# Patient Record
Sex: FEMALE | Race: BLACK OR AFRICAN AMERICAN | Employment: UNEMPLOYED | URBAN - METROPOLITAN AREA
[De-identification: names, ages, dates, MRNs, and addresses within clinical notes are randomized per-mention and may not be internally consistent; named-entity substitution may affect disease eponyms.]

---

## 2018-04-04 ENCOUNTER — HOSPITAL ENCOUNTER (OUTPATIENT)
Age: 18
Discharge: HOME | End: 2018-04-04
Payer: COMMERCIAL

## 2018-04-04 DIAGNOSIS — Z67.91 RH NEGATIVE STATE IN ANTEPARTUM PERIOD: Primary | ICD-10-CM

## 2018-04-04 DIAGNOSIS — O26.899 RH NEGATIVE STATE IN ANTEPARTUM PERIOD: Primary | ICD-10-CM

## 2018-04-04 LAB
ABO/RH: NORMAL
ANTIBODY SCREEN: NORMAL
BASOPHILS ABSOLUTE: 0.04 E9/L (ref 0–0.2)
BASOPHILS RELATIVE PERCENT: 0.4 % (ref 0–2)
EOSINOPHILS ABSOLUTE: 0.05 E9/L (ref 0.05–0.5)
EOSINOPHILS RELATIVE PERCENT: 0.6 % (ref 0–6)
GLUCOSE TOLERANCE SCREEN 50G: 153 MG/DL (ref 70–140)
HCT VFR BLD CALC: 33.9 % (ref 34–48)
HEMOGLOBIN: 11.5 G/DL (ref 11.5–15.5)
IMMATURE GRANULOCYTES #: 0.1 E9/L
IMMATURE GRANULOCYTES %: 1.1 % (ref 0–5)
LYMPHOCYTES ABSOLUTE: 1.56 E9/L (ref 1.5–4)
LYMPHOCYTES RELATIVE PERCENT: 17.5 % (ref 20–42)
Lab: NORMAL
MCH RBC QN AUTO: 32.5 PG (ref 26–35)
MCHC RBC AUTO-ENTMCNC: 33.9 % (ref 32–34.5)
MCV RBC AUTO: 95.8 FL (ref 80–99.9)
MONOCYTES ABSOLUTE: 0.61 E9/L (ref 0.1–0.95)
MONOCYTES RELATIVE PERCENT: 6.8 % (ref 2–12)
NEUTROPHILS ABSOLUTE: 6.55 E9/L (ref 1.8–7.3)
NEUTROPHILS RELATIVE PERCENT: 73.6 % (ref 43–80)
PDW BLD-RTO: 12.7 FL (ref 11.5–15)
PLATELET # BLD: 287 E9/L (ref 130–450)
PMV BLD AUTO: 9 FL (ref 7–12)
RBC # BLD: 3.54 E12/L (ref 3.5–5.5)
REPORT: NORMAL
THIS TEST SENT TO: NORMAL
WBC # BLD: 8.9 E9/L (ref 4.5–11.5)

## 2018-04-04 PROCEDURE — 82950 GLUCOSE TEST: CPT

## 2018-04-04 PROCEDURE — 36415 COLL VENOUS BLD VENIPUNCTURE: CPT

## 2018-04-04 PROCEDURE — 6360000002 HC RX W HCPCS: Performed by: SPECIALIST

## 2018-04-04 PROCEDURE — 86900 BLOOD TYPING SEROLOGIC ABO: CPT

## 2018-04-04 PROCEDURE — 86850 RBC ANTIBODY SCREEN: CPT

## 2018-04-04 PROCEDURE — 85025 COMPLETE CBC W/AUTO DIFF WBC: CPT

## 2018-04-04 PROCEDURE — 86901 BLOOD TYPING SEROLOGIC RH(D): CPT

## 2018-04-04 RX ADMIN — HUMAN RHO(D) IMMUNE GLOBULIN 300 MCG: 300 INJECTION, SOLUTION INTRAMUSCULAR at 11:55

## 2018-04-05 LAB — RHIG LOT NUMBER: NORMAL

## 2018-04-11 ENCOUNTER — HOSPITAL ENCOUNTER (OUTPATIENT)
Age: 18
Discharge: HOME | End: 2018-04-11
Payer: COMMERCIAL

## 2018-04-11 LAB
GLUCOSE FASTING: 81 MG/ML
GLUCOSE TOLERANCE TEST 1 HOUR: 162 MG/DL
GLUCOSE TOLERANCE TEST 2 HOUR: 119 MG/DL
GLUCOSE TOLERANCE TEST 3 HOUR: 76 MG/DL

## 2018-04-11 PROCEDURE — 82951 GLUCOSE TOLERANCE TEST (GTT): CPT

## 2018-04-11 PROCEDURE — 82952 GTT-ADDED SAMPLES: CPT

## 2018-04-11 PROCEDURE — 36415 COLL VENOUS BLD VENIPUNCTURE: CPT

## 2018-06-23 ENCOUNTER — ANESTHESIA (OUTPATIENT)
Dept: LABOR AND DELIVERY | Age: 18
End: 2018-06-23
Payer: COMMERCIAL

## 2018-06-23 ENCOUNTER — ANESTHESIA EVENT (OUTPATIENT)
Dept: LABOR AND DELIVERY | Age: 18
End: 2018-06-23
Payer: COMMERCIAL

## 2018-06-23 ENCOUNTER — HOSPITAL ENCOUNTER (INPATIENT)
Age: 18
LOS: 2 days | Discharge: HOME | End: 2018-06-25
Attending: SPECIALIST | Admitting: SPECIALIST
Payer: COMMERCIAL

## 2018-06-23 PROBLEM — Z34.90 TERM PREGNANCY: Status: ACTIVE | Noted: 2018-06-23

## 2018-06-23 LAB
ABO/RH: NORMAL
AMNISURE PATIENT RESULT: POSITIVE
AMPHETAMINE SCREEN, URINE: NOT DETECTED
ANTIBODY SCREEN: NORMAL
BARBITURATE SCREEN URINE: NOT DETECTED
BASOPHILS ABSOLUTE: 0.05 E9/L (ref 0–0.2)
BASOPHILS RELATIVE PERCENT: 0.4 % (ref 0–2)
BENZODIAZEPINE SCREEN, URINE: NOT DETECTED
CANNABINOID SCREEN URINE: NOT DETECTED
COCAINE METABOLITE SCREEN URINE: NOT DETECTED
EOSINOPHILS ABSOLUTE: 0.21 E9/L (ref 0.05–0.5)
EOSINOPHILS RELATIVE PERCENT: 1.7 % (ref 0–6)
HCT VFR BLD CALC: 31 % (ref 34–48)
HEMOGLOBIN: 10.5 G/DL (ref 11.5–15.5)
IMMATURE GRANULOCYTES #: 0.16 E9/L
IMMATURE GRANULOCYTES %: 1.3 % (ref 0–5)
LYMPHOCYTES ABSOLUTE: 2.96 E9/L (ref 1.5–4)
LYMPHOCYTES RELATIVE PERCENT: 23.8 % (ref 20–42)
MCH RBC QN AUTO: 31.6 PG (ref 26–35)
MCHC RBC AUTO-ENTMCNC: 33.9 % (ref 32–34.5)
MCV RBC AUTO: 93.4 FL (ref 80–99.9)
METHADONE SCREEN, URINE: NOT DETECTED
MONOCYTES ABSOLUTE: 1.24 E9/L (ref 0.1–0.95)
MONOCYTES RELATIVE PERCENT: 10 % (ref 2–12)
NEUTROPHILS ABSOLUTE: 7.8 E9/L (ref 1.8–7.3)
NEUTROPHILS RELATIVE PERCENT: 62.8 % (ref 43–80)
OPIATE SCREEN URINE: NOT DETECTED
PDW BLD-RTO: 12.9 FL (ref 11.5–15)
PHENCYCLIDINE SCREEN URINE: NOT DETECTED
PLATELET # BLD: 348 E9/L (ref 130–450)
PMV BLD AUTO: 9.4 FL (ref 7–12)
PROPOXYPHENE SCREEN: NOT DETECTED
RBC # BLD: 3.32 E12/L (ref 3.5–5.5)
WBC # BLD: 12.4 E9/L (ref 4.5–11.5)

## 2018-06-23 PROCEDURE — 86850 RBC ANTIBODY SCREEN: CPT

## 2018-06-23 PROCEDURE — 6360000002 HC RX W HCPCS: Performed by: SPECIALIST

## 2018-06-23 PROCEDURE — 86900 BLOOD TYPING SEROLOGIC ABO: CPT

## 2018-06-23 PROCEDURE — 3700000025 ANESTHESIA EPIDURAL BLOCK: Performed by: ANESTHESIOLOGY

## 2018-06-23 PROCEDURE — 51702 INSERT TEMP BLADDER CATH: CPT

## 2018-06-23 PROCEDURE — 7200000001 HC VAGINAL DELIVERY

## 2018-06-23 PROCEDURE — 36415 COLL VENOUS BLD VENIPUNCTURE: CPT

## 2018-06-23 PROCEDURE — 00HU33Z INSERTION OF INFUSION DEVICE INTO SPINAL CANAL, PERCUTANEOUS APPROACH: ICD-10-PCS | Performed by: SPECIALIST

## 2018-06-23 PROCEDURE — 1220000001 HC SEMI PRIVATE L&D R&B

## 2018-06-23 PROCEDURE — 3E0R3BZ INTRODUCTION OF ANESTHETIC AGENT INTO SPINAL CANAL, PERCUTANEOUS APPROACH: ICD-10-PCS | Performed by: SPECIALIST

## 2018-06-23 PROCEDURE — 86901 BLOOD TYPING SEROLOGIC RH(D): CPT

## 2018-06-23 PROCEDURE — 80307 DRUG TEST PRSMV CHEM ANLYZR: CPT

## 2018-06-23 PROCEDURE — 6370000000 HC RX 637 (ALT 250 FOR IP): Performed by: SPECIALIST

## 2018-06-23 PROCEDURE — 85025 COMPLETE CBC W/AUTO DIFF WBC: CPT

## 2018-06-23 RX ORDER — 0.9 % SODIUM CHLORIDE 0.9 %
500 INTRAVENOUS SOLUTION INTRAVENOUS ONCE
Status: DISCONTINUED | OUTPATIENT
Start: 2018-06-23 | End: 2018-06-25 | Stop reason: HOSPADM

## 2018-06-23 RX ORDER — SODIUM CHLORIDE 0.9 % (FLUSH) 0.9 %
10 SYRINGE (ML) INJECTION PRN
Status: DISCONTINUED | OUTPATIENT
Start: 2018-06-23 | End: 2018-06-25 | Stop reason: HOSPADM

## 2018-06-23 RX ORDER — SODIUM CHLORIDE 0.9 % (FLUSH) 0.9 %
10 SYRINGE (ML) INJECTION EVERY 12 HOURS SCHEDULED
Status: DISCONTINUED | OUTPATIENT
Start: 2018-06-23 | End: 2018-06-23

## 2018-06-23 RX ORDER — IBUPROFEN 600 MG/1
600 TABLET ORAL EVERY 6 HOURS PRN
Status: DISCONTINUED | OUTPATIENT
Start: 2018-06-23 | End: 2018-06-25 | Stop reason: HOSPADM

## 2018-06-23 RX ORDER — LIDOCAINE HYDROCHLORIDE 10 MG/ML
30 INJECTION, SOLUTION EPIDURAL; INFILTRATION; INTRACAUDAL; PERINEURAL PRN
Status: DISCONTINUED | OUTPATIENT
Start: 2018-06-23 | End: 2018-06-23

## 2018-06-23 RX ORDER — SODIUM CHLORIDE, SODIUM LACTATE, POTASSIUM CHLORIDE, CALCIUM CHLORIDE 600; 310; 30; 20 MG/100ML; MG/100ML; MG/100ML; MG/100ML
INJECTION, SOLUTION INTRAVENOUS CONTINUOUS
Status: DISCONTINUED | OUTPATIENT
Start: 2018-06-23 | End: 2018-06-23 | Stop reason: SDUPTHER

## 2018-06-23 RX ORDER — ONDANSETRON 2 MG/ML
4 INJECTION INTRAMUSCULAR; INTRAVENOUS EVERY 6 HOURS PRN
Status: DISCONTINUED | OUTPATIENT
Start: 2018-06-23 | End: 2018-06-23

## 2018-06-23 RX ORDER — DOCUSATE SODIUM 100 MG/1
100 CAPSULE, LIQUID FILLED ORAL 2 TIMES DAILY
Status: DISCONTINUED | OUTPATIENT
Start: 2018-06-23 | End: 2018-06-25 | Stop reason: HOSPADM

## 2018-06-23 RX ORDER — EPHEDRINE SULFATE/0.9% NACL/PF 50 MG/5 ML
5 SYRINGE (ML) INTRAVENOUS PRN
Status: DISCONTINUED | OUTPATIENT
Start: 2018-06-23 | End: 2018-06-23

## 2018-06-23 RX ORDER — SODIUM CHLORIDE 0.9 % (FLUSH) 0.9 %
10 SYRINGE (ML) INJECTION PRN
Status: DISCONTINUED | OUTPATIENT
Start: 2018-06-23 | End: 2018-06-23

## 2018-06-23 RX ORDER — SODIUM CHLORIDE 0.9 % (FLUSH) 0.9 %
10 SYRINGE (ML) INJECTION EVERY 12 HOURS SCHEDULED
Status: DISCONTINUED | OUTPATIENT
Start: 2018-06-23 | End: 2018-06-25 | Stop reason: HOSPADM

## 2018-06-23 RX ORDER — ACETAMINOPHEN 325 MG/1
650 TABLET ORAL EVERY 4 HOURS PRN
Status: DISCONTINUED | OUTPATIENT
Start: 2018-06-23 | End: 2018-06-25 | Stop reason: HOSPADM

## 2018-06-23 RX ORDER — METHYLERGONOVINE MALEATE 0.2 MG/ML
200 INJECTION INTRAVENOUS
Status: ACTIVE | OUTPATIENT
Start: 2018-06-23 | End: 2018-06-23

## 2018-06-23 RX ORDER — SODIUM CHLORIDE, SODIUM LACTATE, POTASSIUM CHLORIDE, CALCIUM CHLORIDE 600; 310; 30; 20 MG/100ML; MG/100ML; MG/100ML; MG/100ML
INJECTION, SOLUTION INTRAVENOUS CONTINUOUS
Status: DISCONTINUED | OUTPATIENT
Start: 2018-06-23 | End: 2018-06-23

## 2018-06-23 RX ORDER — DOCUSATE SODIUM 100 MG/1
100 CAPSULE, LIQUID FILLED ORAL 2 TIMES DAILY
Status: DISCONTINUED | OUTPATIENT
Start: 2018-06-23 | End: 2018-06-23

## 2018-06-23 RX ORDER — ONDANSETRON 4 MG/1
4 TABLET, FILM COATED ORAL EVERY 8 HOURS PRN
Status: ON HOLD | COMMUNITY
End: 2018-06-24 | Stop reason: HOSPADM

## 2018-06-23 RX ORDER — TERBUTALINE SULFATE 1 MG/ML
0.25 INJECTION, SOLUTION SUBCUTANEOUS ONCE
Status: DISCONTINUED | OUTPATIENT
Start: 2018-06-23 | End: 2018-06-23

## 2018-06-23 RX ORDER — NALBUPHINE HCL 10 MG/ML
10 AMPUL (ML) INJECTION
Status: DISCONTINUED | OUTPATIENT
Start: 2018-06-23 | End: 2018-06-23

## 2018-06-23 RX ADMIN — BENZOCAINE AND LEVOMENTHOL: 200; 5 SPRAY TOPICAL at 12:38

## 2018-06-23 RX ADMIN — ONDANSETRON 4 MG: 2 INJECTION INTRAMUSCULAR; INTRAVENOUS at 07:35

## 2018-06-23 RX ADMIN — NALBUPHINE HYDROCHLORIDE 10 MG: 10 INJECTION, SOLUTION INTRAMUSCULAR; INTRAVENOUS; SUBCUTANEOUS at 07:30

## 2018-06-23 RX ADMIN — IBUPROFEN 600 MG: 600 TABLET ORAL at 12:38

## 2018-06-23 RX ADMIN — Medication 2 MILLI-UNITS/MIN: at 06:00

## 2018-06-23 RX ADMIN — DOCUSATE SODIUM 100 MG: 100 CAPSULE, LIQUID FILLED ORAL at 21:07

## 2018-06-23 ASSESSMENT — PAIN - FUNCTIONAL ASSESSMENT: PAIN_FUNCTIONAL_ASSESSMENT: 0-10

## 2018-06-23 ASSESSMENT — PAIN SCALES - GENERAL
PAINLEVEL_OUTOF10: 0
PAINLEVEL_OUTOF10: 2
PAINLEVEL_OUTOF10: 9

## 2018-06-24 PROBLEM — Z34.90 TERM PREGNANCY: Status: RESOLVED | Noted: 2018-06-23 | Resolved: 2018-06-24

## 2018-06-24 LAB
FETAL SCREEN: NORMAL
HCT VFR BLD CALC: 33.6 % (ref 34–48)
HEMOGLOBIN: 11.3 G/DL (ref 11.5–15.5)
MCH RBC QN AUTO: 32.2 PG (ref 26–35)
MCHC RBC AUTO-ENTMCNC: 33.6 % (ref 32–34.5)
MCV RBC AUTO: 95.7 FL (ref 80–99.9)
PDW BLD-RTO: 13.3 FL (ref 11.5–15)
PLATELET # BLD: 345 E9/L (ref 130–450)
PMV BLD AUTO: 9.3 FL (ref 7–12)
RBC # BLD: 3.51 E12/L (ref 3.5–5.5)
RHIG LOT NUMBER: NORMAL
WBC # BLD: 13.1 E9/L (ref 4.5–11.5)

## 2018-06-24 PROCEDURE — 1220000001 HC SEMI PRIVATE L&D R&B

## 2018-06-24 PROCEDURE — 85461 HEMOGLOBIN FETAL: CPT

## 2018-06-24 PROCEDURE — 85027 COMPLETE CBC AUTOMATED: CPT

## 2018-06-24 PROCEDURE — 6360000002 HC RX W HCPCS: Performed by: SPECIALIST

## 2018-06-24 PROCEDURE — 96372 THER/PROPH/DIAG INJ SC/IM: CPT

## 2018-06-24 PROCEDURE — 90471 IMMUNIZATION ADMIN: CPT | Performed by: SPECIALIST

## 2018-06-24 PROCEDURE — 6370000000 HC RX 637 (ALT 250 FOR IP): Performed by: SPECIALIST

## 2018-06-24 PROCEDURE — 90715 TDAP VACCINE 7 YRS/> IM: CPT | Performed by: SPECIALIST

## 2018-06-24 PROCEDURE — 36415 COLL VENOUS BLD VENIPUNCTURE: CPT

## 2018-06-24 RX ADMIN — ACETAMINOPHEN 650 MG: 325 TABLET, FILM COATED ORAL at 15:36

## 2018-06-24 RX ADMIN — IBUPROFEN 600 MG: 600 TABLET ORAL at 13:09

## 2018-06-24 RX ADMIN — DOCUSATE SODIUM 100 MG: 100 CAPSULE, LIQUID FILLED ORAL at 10:08

## 2018-06-24 RX ADMIN — HUMAN RHO(D) IMMUNE GLOBULIN 300 MCG: 300 INJECTION, SOLUTION INTRAMUSCULAR at 10:06

## 2018-06-24 RX ADMIN — IBUPROFEN 600 MG: 600 TABLET ORAL at 06:12

## 2018-06-24 RX ADMIN — TETANUS TOXOID, REDUCED DIPHTHERIA TOXOID AND ACELLULAR PERTUSSIS VACCINE, ADSORBED 0.5 ML: 5; 2.5; 8; 8; 2.5 SUSPENSION INTRAMUSCULAR at 10:07

## 2018-06-24 RX ADMIN — DOCUSATE SODIUM 100 MG: 100 CAPSULE, LIQUID FILLED ORAL at 22:48

## 2018-06-24 ASSESSMENT — PAIN SCALES - GENERAL
PAINLEVEL_OUTOF10: 4
PAINLEVEL_OUTOF10: 5
PAINLEVEL_OUTOF10: 7

## 2018-06-25 VITALS
TEMPERATURE: 98.9 F | OXYGEN SATURATION: 99 % | RESPIRATION RATE: 16 BRPM | HEART RATE: 60 BPM | SYSTOLIC BLOOD PRESSURE: 112 MMHG | HEIGHT: 63 IN | WEIGHT: 145 LBS | DIASTOLIC BLOOD PRESSURE: 71 MMHG | BODY MASS INDEX: 25.69 KG/M2

## 2018-06-25 PROCEDURE — 6370000000 HC RX 637 (ALT 250 FOR IP): Performed by: SPECIALIST

## 2018-06-25 RX ADMIN — DOCUSATE SODIUM 100 MG: 100 CAPSULE, LIQUID FILLED ORAL at 10:06

## 2018-06-25 ASSESSMENT — PAIN SCALES - GENERAL: PAINLEVEL_OUTOF10: 0

## 2022-10-15 ENCOUNTER — HOSPITAL ENCOUNTER (EMERGENCY)
Age: 22
Discharge: HOME | End: 2022-10-15
Attending: FAMILY MEDICINE

## 2022-10-15 VITALS
HEIGHT: 64 IN | HEART RATE: 67 BPM | WEIGHT: 143 LBS | RESPIRATION RATE: 16 BRPM | DIASTOLIC BLOOD PRESSURE: 75 MMHG | OXYGEN SATURATION: 99 % | BODY MASS INDEX: 24.41 KG/M2 | TEMPERATURE: 97.1 F | SYSTOLIC BLOOD PRESSURE: 116 MMHG

## 2022-10-15 DIAGNOSIS — R55 VASOVAGAL SYNCOPE: Primary | ICD-10-CM

## 2022-10-15 PROCEDURE — 99283 EMERGENCY DEPT VISIT LOW MDM: CPT

## 2022-10-15 PROCEDURE — 93005 ELECTROCARDIOGRAM TRACING: CPT | Performed by: FAMILY MEDICINE

## 2022-10-15 ASSESSMENT — PAIN - FUNCTIONAL ASSESSMENT: PAIN_FUNCTIONAL_ASSESSMENT: NONE - DENIES PAIN

## 2022-10-15 NOTE — ED PROVIDER NOTES
HPI:  10/15/22,   Time: 2:48 PM EDT         Charlee Johnston is a 19 y.o. female presenting to the ED for syncopal episode that occurred today, after she got up and was moving around. She became lightheaded, passed out, did not hit her head. She did not lose control of bowel or bladder. She did not bite her tongue. She has had episodes of lightheadedness in the past, had 2 syncopal episodes during her pregnancy. ROS:   Pertinent positives and negatives are stated within HPI, all other systems reviewed and are negative.  --------------------------------------------- PAST HISTORY ---------------------------------------------  Past Medical History:  has a past medical history of Asthma, Heart problem, Mood disorder ([address] and Scoliosis. Past Surgical History:  has a past surgical history that includes Dental surgery and Dental surgery. Social History:  reports that she has never smoked. She has never used smokeless tobacco. She reports that she does not drink alcohol and does not use drugs. Family History: family history is not on file. The patients home medications have been reviewed. Allergies: Patient has no known allergies.     -------------------------------------------------- RESULTS -------------------------------------------------  All laboratory and radiology results have been personally reviewed by myself   LABS:  Results for orders placed or performed during the hospital encounter of 10/15/22   EKG 12 Lead   Result Value Ref Range    Ventricular Rate 57 BPM    Atrial Rate 57 BPM    P-R Interval 144 ms    QRS Duration 82 ms    Q-T Interval 422 ms    QTc Calculation (Bazett) 410 ms    P Axis 60 degrees    R Axis 62 degrees    T Axis 46 degrees       RADIOLOGY:  Interpreted by Radiologist.  No orders to display       ------------------------- NURSING NOTES AND VITALS REVIEWED ---------------------------   The nursing notes within the ED encounter and vital signs as below have been reviewed. /75   Pulse 67   Temp 97.1 °F (36.2 °C) (Temporal)   Resp 16   Ht 5' 4\" (1.626 m)   Wt 143 lb (64.9 kg)   LMP 09/26/2022   SpO2 99%   BMI 24.55 kg/m²   Oxygen Saturation Interpretation: Normal      ---------------------------------------------------PHYSICAL EXAM--------------------------------------    Constitutional/General: Alert and oriented x3, well appearing, non toxic in NAD  Head: NC/AT  Eyes: PERRL, EOMI  Mouth: Oropharynx clear, handling secretions, no trismus  Neck: Supple, full ROM, no meningeal signs  Pulmonary: Lungs clear to auscultation bilaterally, no wheezes, rales, or rhonchi. Not in respiratory distress  Cardiovascular:  Regular rate and rhythm, no murmurs, gallops, or rubs. 2+ distal pulses  Abdomen: Soft, non tender, non distended,   Extremities: Moves all extremities x 4. Warm and well perfused  Skin: warm and dry without rash  Neurologic: GCS 15,  Psych: Normal Affect      ------------------------------ ED COURSE/MEDICAL DECISION MAKING----------------------  Medications - No data to display    EKG: This EKG is signed and interpreted by me. Rate: 57  Rhythm: Sinus  Interpretation: sinus bradycardia with sinus arrhythmia  Comparison: no previous EKG available       Medical Decision Making:    Patient is asymptomatic, vital signs are stable, heart rate is mildly bradycardic. We given information regarding obtaining a primary care provider. Should her symptoms recur, she go to emergency department that has cardiology support. She was agreeable to this plan she was discharged home in stable condition. Counseling: The emergency provider has spoken with the patient and discussed todays results, in addition to providing specific details for the plan of care and counseling regarding the diagnosis and prognosis.   Questions are answered at this time and they are agreeable with the plan.      --------------------------------- IMPRESSION AND DISPOSITION ---------------------------------    IMPRESSION  1.  Vasovagal syncope        DISPOSITION  Disposition: Discharge to home  Patient condition is stable                 Ariel Olsen MD  10/15/22 1539

## 2022-10-16 LAB
EKG ATRIAL RATE: 57 BPM
EKG P AXIS: 60 DEGREES
EKG P-R INTERVAL: 144 MS
EKG Q-T INTERVAL: 422 MS
EKG QRS DURATION: 82 MS
EKG QTC CALCULATION (BAZETT): 410 MS
EKG R AXIS: 62 DEGREES
EKG T AXIS: 46 DEGREES
EKG VENTRICULAR RATE: 57 BPM

## 2022-12-14 ENCOUNTER — OFFICE VISIT (OUTPATIENT)
Dept: PRIMARY CARE CLINIC | Age: 22
End: 2022-12-14
Payer: MEDICAID

## 2022-12-14 VITALS
OXYGEN SATURATION: 99 % | HEART RATE: 67 BPM | TEMPERATURE: 97.7 F | BODY MASS INDEX: 21.24 KG/M2 | HEIGHT: 64 IN | SYSTOLIC BLOOD PRESSURE: 114 MMHG | DIASTOLIC BLOOD PRESSURE: 72 MMHG | RESPIRATION RATE: 18 BRPM | WEIGHT: 124.4 LBS

## 2022-12-14 DIAGNOSIS — M25.50 POLYARTHRALGIA: ICD-10-CM

## 2022-12-14 DIAGNOSIS — R55 SYNCOPE, UNSPECIFIED SYNCOPE TYPE: Primary | ICD-10-CM

## 2022-12-14 DIAGNOSIS — F41.9 ANXIETY: ICD-10-CM

## 2022-12-14 DIAGNOSIS — Z83.3 FAMILY HISTORY OF DIABETES MELLITUS: ICD-10-CM

## 2022-12-14 LAB
ALBUMIN SERPL-MCNC: 4.7 G/DL (ref 3.5–5.2)
ALP BLD-CCNC: 49 U/L (ref 35–104)
ALT SERPL-CCNC: 7 U/L (ref 0–32)
ANION GAP SERPL CALCULATED.3IONS-SCNC: 13 MMOL/L (ref 7–16)
AST SERPL-CCNC: 14 U/L (ref 0–31)
BASOPHILS ABSOLUTE: 0.04 E9/L (ref 0–0.2)
BASOPHILS RELATIVE PERCENT: 0.5 % (ref 0–2)
BILIRUB SERPL-MCNC: 0.4 MG/DL (ref 0–1.2)
BUN BLDV-MCNC: 6 MG/DL (ref 6–20)
C-REACTIVE PROTEIN: 0.7 MG/DL (ref 0–0.4)
CALCIUM SERPL-MCNC: 10.2 MG/DL (ref 8.6–10.2)
CHLORIDE BLD-SCNC: 102 MMOL/L (ref 98–107)
CO2: 22 MMOL/L (ref 22–29)
CREAT SERPL-MCNC: 0.7 MG/DL (ref 0.5–1)
EOSINOPHILS ABSOLUTE: 0.05 E9/L (ref 0.05–0.5)
EOSINOPHILS RELATIVE PERCENT: 0.6 % (ref 0–6)
GFR SERPL CREATININE-BSD FRML MDRD: >60 ML/MIN/1.73
GLUCOSE BLD-MCNC: 94 MG/DL (ref 74–99)
HBA1C MFR BLD: 5.1 % (ref 4–5.6)
HCT VFR BLD CALC: 35.7 % (ref 34–48)
HEMOGLOBIN: 11.7 G/DL (ref 11.5–15.5)
IMMATURE GRANULOCYTES #: 0.02 E9/L
IMMATURE GRANULOCYTES %: 0.2 % (ref 0–5)
LYMPHOCYTES ABSOLUTE: 1.92 E9/L (ref 1.5–4)
LYMPHOCYTES RELATIVE PERCENT: 23.4 % (ref 20–42)
MCH RBC QN AUTO: 28.8 PG (ref 26–35)
MCHC RBC AUTO-ENTMCNC: 32.8 % (ref 32–34.5)
MCV RBC AUTO: 87.9 FL (ref 80–99.9)
MONOCYTES ABSOLUTE: 0.68 E9/L (ref 0.1–0.95)
MONOCYTES RELATIVE PERCENT: 8.3 % (ref 2–12)
NEUTROPHILS ABSOLUTE: 5.5 E9/L (ref 1.8–7.3)
NEUTROPHILS RELATIVE PERCENT: 67 % (ref 43–80)
PDW BLD-RTO: 15.2 FL (ref 11.5–15)
PLATELET # BLD: 307 E9/L (ref 130–450)
PMV BLD AUTO: 10.7 FL (ref 7–12)
POTASSIUM SERPL-SCNC: 3.8 MMOL/L (ref 3.5–5)
RBC # BLD: 4.06 E12/L (ref 3.5–5.5)
SEDIMENTATION RATE, ERYTHROCYTE: 18 MM/HR (ref 0–20)
SODIUM BLD-SCNC: 137 MMOL/L (ref 132–146)
TOTAL PROTEIN: 8 G/DL (ref 6.4–8.3)
TSH SERPL DL<=0.05 MIU/L-ACNC: 0.3 UIU/ML (ref 0.27–4.2)
WBC # BLD: 8.2 E9/L (ref 4.5–11.5)

## 2022-12-14 PROCEDURE — 99204 OFFICE O/P NEW MOD 45 MIN: CPT | Performed by: STUDENT IN AN ORGANIZED HEALTH CARE EDUCATION/TRAINING PROGRAM

## 2022-12-14 RX ORDER — HYDROXYZINE PAMOATE 25 MG/1
25 CAPSULE ORAL NIGHTLY
Qty: 30 CAPSULE | Refills: 0 | Status: SHIPPED | OUTPATIENT
Start: 2022-12-14 | End: 2023-01-13

## 2022-12-14 RX ORDER — ESCITALOPRAM OXALATE 5 MG/1
5 TABLET ORAL DAILY
Qty: 30 TABLET | Refills: 0 | Status: SHIPPED | OUTPATIENT
Start: 2022-12-14

## 2022-12-14 RX ORDER — MELOXICAM 15 MG/1
15 TABLET ORAL DAILY
Qty: 90 TABLET | Refills: 1 | Status: SHIPPED | OUTPATIENT
Start: 2022-12-14

## 2022-12-14 SDOH — ECONOMIC STABILITY: FOOD INSECURITY: WITHIN THE PAST 12 MONTHS, YOU WORRIED THAT YOUR FOOD WOULD RUN OUT BEFORE YOU GOT MONEY TO BUY MORE.: NEVER TRUE

## 2022-12-14 SDOH — ECONOMIC STABILITY: FOOD INSECURITY: WITHIN THE PAST 12 MONTHS, THE FOOD YOU BOUGHT JUST DIDN'T LAST AND YOU DIDN'T HAVE MONEY TO GET MORE.: NEVER TRUE

## 2022-12-14 ASSESSMENT — PATIENT HEALTH QUESTIONNAIRE - PHQ9
9. THOUGHTS THAT YOU WOULD BE BETTER OFF DEAD, OR OF HURTING YOURSELF: 0
4. FEELING TIRED OR HAVING LITTLE ENERGY: 1
6. FEELING BAD ABOUT YOURSELF - OR THAT YOU ARE A FAILURE OR HAVE LET YOURSELF OR YOUR FAMILY DOWN: 3
1. LITTLE INTEREST OR PLEASURE IN DOING THINGS: 3
7. TROUBLE CONCENTRATING ON THINGS, SUCH AS READING THE NEWSPAPER OR WATCHING TELEVISION: 0
SUM OF ALL RESPONSES TO PHQ QUESTIONS 1-9: 14
10. IF YOU CHECKED OFF ANY PROBLEMS, HOW DIFFICULT HAVE THESE PROBLEMS MADE IT FOR YOU TO DO YOUR WORK, TAKE CARE OF THINGS AT HOME, OR GET ALONG WITH OTHER PEOPLE: 1
SUM OF ALL RESPONSES TO PHQ QUESTIONS 1-9: 14
2. FEELING DOWN, DEPRESSED OR HOPELESS: 3
SUM OF ALL RESPONSES TO PHQ QUESTIONS 1-9: 14
5. POOR APPETITE OR OVEREATING: 3
3. TROUBLE FALLING OR STAYING ASLEEP: 1
SUM OF ALL RESPONSES TO PHQ QUESTIONS 1-9: 14
8. MOVING OR SPEAKING SO SLOWLY THAT OTHER PEOPLE COULD HAVE NOTICED. OR THE OPPOSITE, BEING SO FIGETY OR RESTLESS THAT YOU HAVE BEEN MOVING AROUND A LOT MORE THAN USUAL: 0
SUM OF ALL RESPONSES TO PHQ9 QUESTIONS 1 & 2: 6

## 2022-12-14 ASSESSMENT — SOCIAL DETERMINANTS OF HEALTH (SDOH): HOW HARD IS IT FOR YOU TO PAY FOR THE VERY BASICS LIKE FOOD, HOUSING, MEDICAL CARE, AND HEATING?: SOMEWHAT HARD

## 2022-12-14 ASSESSMENT — ENCOUNTER SYMPTOMS
RESPIRATORY NEGATIVE: 1
GASTROINTESTINAL NEGATIVE: 1

## 2022-12-20 DIAGNOSIS — M41.9 SCOLIOSIS, UNSPECIFIED SCOLIOSIS TYPE, UNSPECIFIED SPINAL REGION: Primary | ICD-10-CM

## 2023-01-28 DIAGNOSIS — M25.50 POLYARTHRALGIA: ICD-10-CM

## 2023-01-28 DIAGNOSIS — F41.9 ANXIETY: ICD-10-CM

## 2023-01-30 ENCOUNTER — PATIENT MESSAGE (OUTPATIENT)
Dept: PRIMARY CARE CLINIC | Age: 23
End: 2023-01-30

## 2023-01-30 RX ORDER — MELOXICAM 15 MG/1
15 TABLET ORAL DAILY
Qty: 90 TABLET | Refills: 1 | Status: SHIPPED | OUTPATIENT
Start: 2023-01-30

## 2023-01-30 RX ORDER — ESCITALOPRAM OXALATE 5 MG/1
5 TABLET ORAL DAILY
Qty: 30 TABLET | Refills: 0 | Status: SHIPPED | OUTPATIENT
Start: 2023-01-30

## 2023-01-30 NOTE — TELEPHONE ENCOUNTER
From: Elmo Mitchell  To: Dr. Jesus Diaz  Sent: 1/30/2023 11:11 AM EST  Subject: Quick question     Do you guys do Tb tests at all?

## 2023-02-01 ENCOUNTER — HOSPITAL ENCOUNTER (OUTPATIENT)
Dept: GENERAL RADIOLOGY | Age: 23
Discharge: HOME | End: 2023-02-03
Payer: MEDICAID

## 2023-02-01 ENCOUNTER — HOSPITAL ENCOUNTER (OUTPATIENT)
Age: 23
Discharge: HOME | End: 2023-02-03
Payer: MEDICAID

## 2023-02-01 DIAGNOSIS — M41.9 SCOLIOSIS, UNSPECIFIED SCOLIOSIS TYPE, UNSPECIFIED SPINAL REGION: ICD-10-CM

## 2023-02-01 PROCEDURE — 72082 X-RAY EXAM ENTIRE SPI 2/3 VW: CPT

## 2023-02-09 ENCOUNTER — OFFICE VISIT (OUTPATIENT)
Dept: PRIMARY CARE CLINIC | Age: 23
End: 2023-02-09
Payer: MEDICAID

## 2023-02-09 VITALS
SYSTOLIC BLOOD PRESSURE: 100 MMHG | HEIGHT: 64 IN | HEART RATE: 79 BPM | WEIGHT: 133 LBS | OXYGEN SATURATION: 99 % | DIASTOLIC BLOOD PRESSURE: 64 MMHG | TEMPERATURE: 97.8 F | BODY MASS INDEX: 22.71 KG/M2

## 2023-02-09 DIAGNOSIS — F41.9 ANXIETY: Primary | ICD-10-CM

## 2023-02-09 PROCEDURE — 99213 OFFICE O/P EST LOW 20 MIN: CPT | Performed by: STUDENT IN AN ORGANIZED HEALTH CARE EDUCATION/TRAINING PROGRAM

## 2023-02-09 RX ORDER — ESCITALOPRAM OXALATE 10 MG/1
10 TABLET ORAL DAILY
Qty: 90 TABLET | Refills: 1 | Status: SHIPPED | OUTPATIENT
Start: 2023-02-09

## 2023-02-09 SDOH — ECONOMIC STABILITY: INCOME INSECURITY: HOW HARD IS IT FOR YOU TO PAY FOR THE VERY BASICS LIKE FOOD, HOUSING, MEDICAL CARE, AND HEATING?: NOT HARD AT ALL

## 2023-02-09 SDOH — ECONOMIC STABILITY: FOOD INSECURITY: WITHIN THE PAST 12 MONTHS, YOU WORRIED THAT YOUR FOOD WOULD RUN OUT BEFORE YOU GOT MONEY TO BUY MORE.: NEVER TRUE

## 2023-02-09 SDOH — ECONOMIC STABILITY: FOOD INSECURITY: WITHIN THE PAST 12 MONTHS, THE FOOD YOU BOUGHT JUST DIDN'T LAST AND YOU DIDN'T HAVE MONEY TO GET MORE.: NEVER TRUE

## 2023-02-09 SDOH — ECONOMIC STABILITY: HOUSING INSECURITY
IN THE LAST 12 MONTHS, WAS THERE A TIME WHEN YOU DID NOT HAVE A STEADY PLACE TO SLEEP OR SLEPT IN A SHELTER (INCLUDING NOW)?: NO

## 2023-02-09 ASSESSMENT — PATIENT HEALTH QUESTIONNAIRE - PHQ9
SUM OF ALL RESPONSES TO PHQ9 QUESTIONS 1 & 2: 2
2. FEELING DOWN, DEPRESSED OR HOPELESS: 1
SUM OF ALL RESPONSES TO PHQ QUESTIONS 1-9: 2
1. LITTLE INTEREST OR PLEASURE IN DOING THINGS: 1
SUM OF ALL RESPONSES TO PHQ QUESTIONS 1-9: 2

## 2023-02-09 ASSESSMENT — ENCOUNTER SYMPTOMS
RESPIRATORY NEGATIVE: 1
GASTROINTESTINAL NEGATIVE: 1

## 2023-02-09 NOTE — PROGRESS NOTES
Cheryl Mariee (:  2000) is a 23 y.o. female,Established patient, here for evaluation of the following chief complaint(s):  Follow-up (She is doing fine.)         ASSESSMENT/PLAN:  1. Anxiety  The following orders have not been finalized:  -     escitalopram (LEXAPRO) 10 MG tablet    No follow-ups on file. Subjective   SUBJECTIVE/OBJECTIVE:  HPI    Patient is a 24 y/o F with a PMHx of anxiety who presents for follow up. Reports she has felt the lexapro has helped a little and would like to increase dose; sleeps fine    Meloxicam helps her joints somewhat-takes as needed    Reviewed labs with patient     Review of Systems   Constitutional: Negative. HENT: Negative. Respiratory: Negative. Cardiovascular: Negative. Gastrointestinal: Negative. Musculoskeletal:  Positive for arthralgias. Skin: Negative. Psychiatric/Behavioral: Negative. Objective   Physical Exam  Vitals reviewed. Constitutional:       General: She is not in acute distress. Appearance: Normal appearance. HENT:      Head: Normocephalic and atraumatic. Eyes:      General:         Right eye: No discharge. Left eye: No discharge. Skin:     General: Skin is warm and dry. Neurological:      General: No focal deficit present. Mental Status: She is alert and oriented to person, place, and time. Psychiatric:         Mood and Affect: Mood normal.         Behavior: Behavior normal.              An electronic signature was used to authenticate this note.     --Flaco Bourgeois MD

## 2023-03-05 DIAGNOSIS — M25.50 POLYARTHRALGIA: ICD-10-CM

## 2023-03-05 DIAGNOSIS — F41.9 ANXIETY: ICD-10-CM

## 2023-03-06 RX ORDER — ESCITALOPRAM OXALATE 10 MG/1
10 TABLET ORAL DAILY
Qty: 90 TABLET | Refills: 1 | Status: SHIPPED | OUTPATIENT
Start: 2023-03-06

## 2023-03-06 RX ORDER — MELOXICAM 15 MG/1
15 TABLET ORAL DAILY
Qty: 90 TABLET | Refills: 1 | Status: SHIPPED | OUTPATIENT
Start: 2023-03-06

## 2023-05-01 ENCOUNTER — HOSPITAL ENCOUNTER (OUTPATIENT)
Dept: ULTRASOUND IMAGING | Age: 23
Discharge: HOME | End: 2023-05-01
Payer: COMMERCIAL

## 2023-05-01 DIAGNOSIS — O03.80: ICD-10-CM

## 2023-05-01 PROCEDURE — 76856 US EXAM PELVIC COMPLETE: CPT

## 2023-05-01 PROCEDURE — 76830 TRANSVAGINAL US NON-OB: CPT

## 2023-06-14 ENCOUNTER — APPOINTMENT (OUTPATIENT)
Dept: GENERAL RADIOLOGY | Age: 23
End: 2023-06-14
Payer: OTHER MISCELLANEOUS

## 2023-06-14 ENCOUNTER — HOSPITAL ENCOUNTER (EMERGENCY)
Age: 23
Discharge: HOME | End: 2023-06-14
Attending: EMERGENCY MEDICINE
Payer: OTHER MISCELLANEOUS

## 2023-06-14 VITALS
RESPIRATION RATE: 16 BRPM | DIASTOLIC BLOOD PRESSURE: 70 MMHG | TEMPERATURE: 98.2 F | BODY MASS INDEX: 22.02 KG/M2 | HEART RATE: 81 BPM | HEIGHT: 64 IN | WEIGHT: 129 LBS | SYSTOLIC BLOOD PRESSURE: 107 MMHG | OXYGEN SATURATION: 98 %

## 2023-06-14 DIAGNOSIS — S33.5XXA LUMBAR SPRAIN, INITIAL ENCOUNTER: ICD-10-CM

## 2023-06-14 DIAGNOSIS — S13.9XXA NECK SPRAIN, INITIAL ENCOUNTER: Primary | ICD-10-CM
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
## 2023-06-14 PROCEDURE — 6370000000 HC RX 637 (ALT 250 FOR IP): Performed by: EMERGENCY MEDICINE

## 2023-06-14 PROCEDURE — 99283 EMERGENCY DEPT VISIT LOW MDM: CPT

## 2023-06-14 PROCEDURE — 72100 X-RAY EXAM L-S SPINE 2/3 VWS: CPT

## 2023-06-14 PROCEDURE — 72040 X-RAY EXAM NECK SPINE 2-3 VW: CPT

## 2023-06-14 RX ORDER — CYCLOBENZAPRINE HCL 10 MG
10 TABLET ORAL 3 TIMES DAILY PRN
Qty: 15 TABLET | Refills: 0 | Status: SHIPPED | OUTPATIENT
Start: 2023-06-14 | End: 2023-06-19

## 2023-06-14 RX ORDER — IBUPROFEN 600 MG/1
600 TABLET ORAL EVERY 8 HOURS PRN
Qty: 30 TABLET | Refills: 0 | Status: SHIPPED | OUTPATIENT
Start: 2023-06-14 | End: 2023-06-24

## 2023-06-14 RX ADMIN — IBUPROFEN 600 MG: 200 TABLET, FILM COATED ORAL at 18:37

## 2023-06-14 ASSESSMENT — ENCOUNTER SYMPTOMS
WHEEZING: 0
DIARRHEA: 0
EYE REDNESS: 0
SHORTNESS OF BREATH: 0
COUGH: 0
BACK PAIN: 0
ABDOMINAL DISTENTION: 0
SORE THROAT: 0
EYE PAIN: 0
SINUS PRESSURE: 0
NAUSEA: 0
EYE DISCHARGE: 0
BLOOD IN STOOL: 0
VOMITING: 0
ABDOMINAL PAIN: 0

## 2023-06-14 ASSESSMENT — PAIN SCALES - GENERAL: PAINLEVEL_OUTOF10: 8

## 2023-06-14 ASSESSMENT — PAIN - FUNCTIONAL ASSESSMENT: PAIN_FUNCTIONAL_ASSESSMENT: 0-10

## 2023-06-14 ASSESSMENT — PAIN DESCRIPTION - LOCATION: LOCATION: BACK;NECK

## 2023-06-14 ASSESSMENT — PAIN DESCRIPTION - ORIENTATION: ORIENTATION: RIGHT
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
## 2023-07-15 ENCOUNTER — APPOINTMENT (OUTPATIENT)
Dept: ULTRASOUND IMAGING | Age: 23
End: 2023-07-15
Payer: COMMERCIAL

## 2023-07-15 ENCOUNTER — HOSPITAL ENCOUNTER (EMERGENCY)
Age: 23
Discharge: HOME | End: 2023-07-15
Attending: EMERGENCY MEDICINE
Payer: COMMERCIAL

## 2023-07-15 ENCOUNTER — APPOINTMENT (OUTPATIENT)
Dept: GENERAL RADIOLOGY | Age: 23
End: 2023-07-15
Payer: COMMERCIAL

## 2023-07-15 VITALS
DIASTOLIC BLOOD PRESSURE: 64 MMHG | TEMPERATURE: 97.8 F | BODY MASS INDEX: 21.34 KG/M2 | WEIGHT: 125 LBS | RESPIRATION RATE: 14 BRPM | HEART RATE: 75 BPM | HEIGHT: 64 IN | OXYGEN SATURATION: 100 % | SYSTOLIC BLOOD PRESSURE: 96 MMHG

## 2023-07-15 DIAGNOSIS — O03.9 MISCARRIAGE AT 8 TO 28 WEEKS GESTATION: ICD-10-CM

## 2023-07-15 DIAGNOSIS — R55 SYNCOPE, UNSPECIFIED SYNCOPE TYPE: ICD-10-CM

## 2023-07-15 DIAGNOSIS — O46.90 VAGINAL BLEEDING IN PREGNANCY: Primary | ICD-10-CM

## 2023-07-15 LAB
ABO + RH BLD: NORMAL
ALBUMIN SERPL-MCNC: 4.3 G/DL (ref 3.5–5.2)
ALP SERPL-CCNC: 41 U/L (ref 35–104)
ALT SERPL-CCNC: 13 U/L (ref 0–32)
ANION GAP SERPL CALCULATED.3IONS-SCNC: 10 MMOL/L (ref 7–16)
ARM BAND NUMBER: NORMAL
AST SERPL-CCNC: 17 U/L (ref 0–31)
BASOPHILS # BLD: 0.04 K/UL (ref 0–0.2)
BASOPHILS NFR BLD: 0 % (ref 0–2)
BILIRUB SERPL-MCNC: 0.2 MG/DL (ref 0–1.2)
BILIRUB UR QL STRIP: NEGATIVE
BLOOD BANK SAMPLE EXPIRATION: NORMAL
BLOOD GROUP ANTIBODIES SERPL: NEGATIVE
BUN SERPL-MCNC: 9 MG/DL (ref 6–20)
CALCIUM SERPL-MCNC: 9.5 MG/DL (ref 8.6–10.2)
CHLORIDE SERPL-SCNC: 103 MMOL/L (ref 98–107)
CLARITY UR: ABNORMAL
CO2 SERPL-SCNC: 22 MMOL/L (ref 22–29)
COLOR UR: ABNORMAL
CREAT SERPL-MCNC: 0.8 MG/DL (ref 0.5–1)
EKG ATRIAL RATE: 87 BPM
EKG P AXIS: 72 DEGREES
EKG P-R INTERVAL: 128 MS
EKG Q-T INTERVAL: 356 MS
EKG QRS DURATION: 80 MS
EKG QTC CALCULATION (BAZETT): 428 MS
EKG R AXIS: 70 DEGREES
EKG T AXIS: 38 DEGREES
EKG VENTRICULAR RATE: 87 BPM
EOSINOPHIL # BLD: 0.03 K/UL (ref 0.05–0.5)
EOSINOPHILS RELATIVE PERCENT: 0 % (ref 0–6)
ERYTHROCYTE [DISTWIDTH] IN BLOOD BY AUTOMATED COUNT: 13.8 % (ref 11.5–15)
GFR SERPL CREATININE-BSD FRML MDRD: >60 ML/MIN/1.73M2
GLUCOSE SERPL-MCNC: 101 MG/DL (ref 74–99)
GLUCOSE UR STRIP-MCNC: NEGATIVE MG/DL
HCT VFR BLD AUTO: 30.4 % (ref 34–48)
HGB BLD-MCNC: 10.4 G/DL (ref 11.5–15.5)
HGB UR QL STRIP.AUTO: ABNORMAL
IMM GRANULOCYTES # BLD AUTO: 0.06 K/UL (ref 0–0.58)
IMM GRANULOCYTES NFR BLD: 0 % (ref 0–5)
KETONES UR STRIP-MCNC: ABNORMAL MG/DL
LACTATE BLDV-SCNC: 2.1 MMOL/L (ref 0.5–2.2)
LEUKOCYTE ESTERASE UR QL STRIP: NEGATIVE
LIPASE SERPL-CCNC: 32 U/L (ref 13–60)
LYMPHOCYTES # BLD: 11 % (ref 20–42)
LYMPHOCYTES NFR BLD: 1.48 K/UL (ref 1.5–4)
MCH RBC QN AUTO: 30.9 PG (ref 26–35)
MCHC RBC AUTO-ENTMCNC: 34.2 G/DL (ref 32–34.5)
MCV RBC AUTO: 90.2 FL (ref 80–99.9)
MONOCYTES NFR BLD: 0.73 K/UL (ref 0.1–0.95)
MONOCYTES NFR BLD: 5 % (ref 2–12)
NEUTROPHILS NFR BLD: 83 % (ref 43–80)
NEUTS SEG NFR BLD: 11.26 K/UL (ref 1.8–7.3)
NITRITE UR QL STRIP: NEGATIVE
PH UR STRIP: 6 [PH] (ref 5–9)
PLATELET # BLD AUTO: 299 K/UL (ref 130–450)
PMV BLD AUTO: 9.8 FL (ref 7–12)
POTASSIUM SERPL-SCNC: 3.9 MMOL/L (ref 3.5–5)
PROT SERPL-MCNC: 7.1 G/DL (ref 6.4–8.3)
PROT UR STRIP-MCNC: >=300 MG/DL
RBC # BLD AUTO: 3.37 M/UL (ref 3.5–5.5)
RBC #/AREA URNS HPF: ABNORMAL /HPF
SODIUM SERPL-SCNC: 135 MMOL/L (ref 132–146)
SP GR UR STRIP: >1.03 (ref 1–1.03)
UROBILINOGEN UR STRIP-ACNC: 0.2 EU/DL (ref 0–1)
WBC #/AREA URNS HPF: ABNORMAL /HPF
WBC OTHER # BLD: 13.6 K/UL (ref 4.5–11.5)

## 2023-07-15 PROCEDURE — 86850 RBC ANTIBODY SCREEN: CPT

## 2023-07-15 PROCEDURE — 85027 COMPLETE CBC AUTOMATED: CPT

## 2023-07-15 PROCEDURE — 93010 ELECTROCARDIOGRAM REPORT: CPT | Performed by: INTERNAL MEDICINE

## 2023-07-15 PROCEDURE — 93005 ELECTROCARDIOGRAM TRACING: CPT | Performed by: STUDENT IN AN ORGANIZED HEALTH CARE EDUCATION/TRAINING PROGRAM

## 2023-07-15 PROCEDURE — 96374 THER/PROPH/DIAG INJ IV PUSH: CPT

## 2023-07-15 PROCEDURE — 84702 CHORIONIC GONADOTROPIN TEST: CPT

## 2023-07-15 PROCEDURE — 99285 EMERGENCY DEPT VISIT HI MDM: CPT

## 2023-07-15 PROCEDURE — 71045 X-RAY EXAM CHEST 1 VIEW: CPT

## 2023-07-15 PROCEDURE — 83605 ASSAY OF LACTIC ACID: CPT

## 2023-07-15 PROCEDURE — 96372 THER/PROPH/DIAG INJ SC/IM: CPT

## 2023-07-15 PROCEDURE — 83690 ASSAY OF LIPASE: CPT

## 2023-07-15 PROCEDURE — 6370000000 HC RX 637 (ALT 250 FOR IP): Performed by: STUDENT IN AN ORGANIZED HEALTH CARE EDUCATION/TRAINING PROGRAM

## 2023-07-15 PROCEDURE — 86900 BLOOD TYPING SEROLOGIC ABO: CPT

## 2023-07-15 PROCEDURE — 86901 BLOOD TYPING SEROLOGIC RH(D): CPT

## 2023-07-15 PROCEDURE — 80053 COMPREHEN METABOLIC PANEL: CPT

## 2023-07-15 PROCEDURE — 76801 OB US < 14 WKS SINGLE FETUS: CPT

## 2023-07-15 PROCEDURE — 96361 HYDRATE IV INFUSION ADD-ON: CPT

## 2023-07-15 PROCEDURE — 6360000002 HC RX W HCPCS: Performed by: STUDENT IN AN ORGANIZED HEALTH CARE EDUCATION/TRAINING PROGRAM

## 2023-07-15 PROCEDURE — 81001 URINALYSIS AUTO W/SCOPE: CPT

## 2023-07-15 PROCEDURE — 2580000003 HC RX 258: Performed by: STUDENT IN AN ORGANIZED HEALTH CARE EDUCATION/TRAINING PROGRAM

## 2023-07-15 RX ORDER — ACETAMINOPHEN 325 MG/1
650 TABLET ORAL ONCE
Status: COMPLETED | OUTPATIENT
Start: 2023-07-15 | End: 2023-07-15

## 2023-07-15 RX ORDER — FENTANYL CITRATE 50 UG/ML
50 INJECTION, SOLUTION INTRAMUSCULAR; INTRAVENOUS ONCE
Status: DISCONTINUED | OUTPATIENT
Start: 2023-07-15 | End: 2023-07-15

## 2023-07-15 RX ORDER — KETOROLAC TROMETHAMINE 30 MG/ML
15 INJECTION, SOLUTION INTRAMUSCULAR; INTRAVENOUS ONCE
Status: COMPLETED | OUTPATIENT
Start: 2023-07-15 | End: 2023-07-15

## 2023-07-15 RX ORDER — 0.9 % SODIUM CHLORIDE 0.9 %
1000 INTRAVENOUS SOLUTION INTRAVENOUS ONCE
Status: COMPLETED | OUTPATIENT
Start: 2023-07-15 | End: 2023-07-15

## 2023-07-15 RX ADMIN — HUMAN RHO(D) IMMUNE GLOBULIN 300 MCG: 300 INJECTION, SOLUTION INTRAMUSCULAR at 22:19

## 2023-07-15 RX ADMIN — SODIUM CHLORIDE 1000 ML: 9 INJECTION, SOLUTION INTRAVENOUS at 13:42

## 2023-07-15 RX ADMIN — KETOROLAC TROMETHAMINE 15 MG: 30 INJECTION, SOLUTION INTRAMUSCULAR; INTRAVENOUS at 18:12

## 2023-07-15 RX ADMIN — ACETAMINOPHEN 650 MG: 325 TABLET ORAL at 17:00

## 2023-07-15 ASSESSMENT — LIFESTYLE VARIABLES
HOW OFTEN DO YOU HAVE A DRINK CONTAINING ALCOHOL: NEVER
HOW MANY STANDARD DRINKS CONTAINING ALCOHOL DO YOU HAVE ON A TYPICAL DAY: PATIENT DOES NOT DRINK

## 2023-07-15 ASSESSMENT — PAIN DESCRIPTION - FREQUENCY: FREQUENCY: INTERMITTENT

## 2023-07-15 ASSESSMENT — PAIN SCALES - GENERAL
PAINLEVEL_OUTOF10: 9
PAINLEVEL_OUTOF10: 6
PAINLEVEL_OUTOF10: 7

## 2023-07-15 ASSESSMENT — PAIN DESCRIPTION - LOCATION: LOCATION: ABDOMEN

## 2023-07-15 ASSESSMENT — PAIN DESCRIPTION - DESCRIPTORS: DESCRIPTORS: CRAMPING

## 2023-07-15 ASSESSMENT — PAIN DESCRIPTION - PAIN TYPE: TYPE: ACUTE PAIN

## 2023-07-15 ASSESSMENT — PAIN - FUNCTIONAL ASSESSMENT
PAIN_FUNCTIONAL_ASSESSMENT: ACTIVITIES ARE NOT PREVENTED
PAIN_FUNCTIONAL_ASSESSMENT: 0-10

## 2023-07-15 ASSESSMENT — PAIN DESCRIPTION - ONSET: ONSET: SUDDEN

## 2023-07-15 ASSESSMENT — PAIN DESCRIPTION - ORIENTATION: ORIENTATION: LOWER

## 2023-07-15 NOTE — DISCHARGE INSTRUCTIONS
Please follow-up with your OB/GYN by calling office Monday morning and schedule an appointment. Please also follow-up with your primary care doctor. Please return to ED for any new or worsening symptoms. US OB LESS THAN 14 WEEKS SINGLE OR FIRST GESTATION   Final Result   1. Pregnancy of unknown location. An intrauterine gestational sac is not   identified. Endometrium measured 25 mm with possible heterogeneous fluid in   the endometrial canal.  Cervix appears closed. 2.  Normal appearance of the bilateral ovaries. There does appear to be a   corpus luteum cyst on the right. No adnexal masses. Normal ovarian   vascularity. Recommendation: Suggest trending quantitative beta HCG levels. A repeat   pelvic ultrasound would be recommended in 4-5 weeks if there is appropriate   rise and quantitative beta HCG levels. Imaging should occur sooner if   patient's symptoms continue to worsen. XR CHEST PORTABLE   Final Result   No evidence of active cardiopulmonary pathology.            Labs Reviewed   CBC WITH AUTO DIFFERENTIAL - Abnormal; Notable for the following components:       Result Value    WBC 13.6 (*)     RBC 3.37 (*)     Hemoglobin 10.4 (*)     Hematocrit 30.4 (*)     Seg Neutrophils 83 (*)     Lymphocytes 11 (*)     Segs Absolute 11.26 (*)     Absolute Lymph # 1.48 (*)     Absolute Eos # 0.03 (*)     All other components within normal limits   COMPREHENSIVE METABOLIC PANEL - Abnormal; Notable for the following components:    Glucose 101 (*)     All other components within normal limits   LACTIC ACID   LIPASE   URINALYSIS WITH MICROSCOPIC   HCG, QUANTITATIVE, PREGNANCY   TYPE AND SCREEN

## 2023-07-16 LAB
B-HCG SERPL EIA 3RD IS-ACNC: 6786 MIU/ML (ref 0–7)
COMPONENT: NORMAL
STATUS OF UNITS: NORMAL
TRANSFUSION STATUS: NORMAL
UNIT DIVISION: 0
UNIT NUMBER: NORMAL

## 2023-10-30 NOTE — PROGRESS NOTES
Mona Vu (:  2000) is a 23 y.o. female,New patient, here for evaluation of the following chief complaint(s):  Establish Care         ASSESSMENT/PLAN:  1. Syncope, unspecified syncope type  -     Cardiac event monitor; Future  -     Echocardiogram complete; Future  2. Anxiety  -     escitalopram (LEXAPRO) 5 MG tablet; Take 1 tablet by mouth daily, Disp-30 tablet, R-0Normal  -     hydrOXYzine pamoate (VISTARIL) 25 MG capsule; Take 1 capsule by mouth at bedtime, Disp-30 capsule, R-0Normal  -     TSH; Future  3. Polyarthralgia  -     CBC with Auto Differential; Future  -     Comprehensive Metabolic Panel; Future  -     Sedimentation Rate; Future  -     C-Reactive Protein; Future  -     meloxicam (MOBIC) 15 MG tablet; Take 1 tablet by mouth daily, Disp-90 tablet, R-1Normal  4. Family history of diabetes mellitus  -     Hemoglobin A1C; Future    Return in about 4 weeks (around 2023). Discussed potential side effects of lexapro and advised patient to call if experiences any side effects    Work up as above for syncope; may be a component of vasovagal syncope and discussed staying well hydrated, wearing compression stockings    Subjective   SUBJECTIVE/OBJECTIVE:  HPI    Patient is a 22 y/o F with no significant PMHx who presents to establish care.     She had several episodes of syncope and when she went to the ED in October where her EKG showed sinus bradycardia; she has had no episodes since then; she does describe a prodrome of lightheadedness, sweating, tunnel vision; can be made worse with prolonged standing; no seizure like activity and she denies being confused when she regains consciousness; no bowel/bladder incontinence or tongue biting; she reports staying well hydrated; denies any chest pain or dyspnea with exertion; family hx of CAD    She has been dealing with anxiety and depression for quite some time; she gets irritable, has difficulty sleeping because she cannot shut her mind off; she feels this is affecting her daily life; she denies SI; she had been on sertraline years ago and did not feel this helped; she is not interested in counseling at this time    She is complaining of chronic pain in both her knees and back-she does have scoliosis; she did play lots of sports when she was younger and had ligament sprains but no surgeries; pain is intermittent and sharp; she reports some swelling in her knee; has some morning stiffness that lasts less than an hour; there is a family history of RA    FMHx: T2DM, CAD, RA    SGHx: denies    Social: never a smoker       Review of Systems   Constitutional: Negative. HENT: Negative. Respiratory: Negative. Cardiovascular:  Positive for palpitations. Gastrointestinal: Negative. Musculoskeletal:  Positive for arthralgias and joint swelling. Skin: Negative. Neurological:  Positive for light-headedness. Psychiatric/Behavioral:  Positive for agitation and sleep disturbance. Negative for suicidal ideas. The patient is nervous/anxious. Objective   Physical Exam  Vitals reviewed. Constitutional:       General: She is not in acute distress. Appearance: Normal appearance. HENT:      Head: Normocephalic and atraumatic. Right Ear: Tympanic membrane, ear canal and external ear normal. There is no impacted cerumen. Left Ear: Tympanic membrane, ear canal and external ear normal. There is no impacted cerumen. Mouth/Throat:      Mouth: Mucous membranes are moist.      Pharynx: Oropharynx is clear. Eyes:      General:         Right eye: No discharge. Left eye: No discharge. Cardiovascular:      Rate and Rhythm: Normal rate and regular rhythm. Pulses: Normal pulses. Heart sounds: Normal heart sounds. Musculoskeletal:         General: Tenderness (TTP BL anterior patella) present. Cervical back: No tenderness.       Comments: No ligament laxity, no patella subluxation, no joint line tenderness Skin:     General: Skin is warm and dry. Neurological:      General: No focal deficit present. Mental Status: She is alert and oriented to person, place, and time. Psychiatric:         Mood and Affect: Mood normal.         Behavior: Behavior normal.              An electronic signature was used to authenticate this note.     --Kvng Gonzales MD none

## 2023-12-15 ENCOUNTER — OFFICE VISIT (OUTPATIENT)
Dept: PRIMARY CARE CLINIC | Age: 23
End: 2023-12-15
Payer: COMMERCIAL

## 2023-12-15 VITALS
BODY MASS INDEX: 18.9 KG/M2 | WEIGHT: 110.7 LBS | TEMPERATURE: 99.1 F | OXYGEN SATURATION: 99 % | DIASTOLIC BLOOD PRESSURE: 60 MMHG | HEIGHT: 64 IN | SYSTOLIC BLOOD PRESSURE: 102 MMHG | HEART RATE: 104 BPM

## 2023-12-15 DIAGNOSIS — Z00.00 ENCOUNTER FOR WELL ADULT EXAM WITHOUT ABNORMAL FINDINGS: Primary | ICD-10-CM

## 2023-12-15 PROCEDURE — G0439 PPPS, SUBSEQ VISIT: HCPCS | Performed by: STUDENT IN AN ORGANIZED HEALTH CARE EDUCATION/TRAINING PROGRAM

## 2024-08-07 ENCOUNTER — HOSPITAL ENCOUNTER (OUTPATIENT)
Age: 24
Discharge: HOME | End: 2024-08-07
Payer: COMMERCIAL

## 2024-08-07 LAB
AMOUNT GLUCOSE GIVEN: 50 G
COLLECT TIME, 1HR GLUCOSE: NORMAL
ERYTHROCYTE [DISTWIDTH] IN BLOOD BY AUTOMATED COUNT: 12.9 % (ref 11.5–15)
GLUCOSE 3H P 100 G GLC PO SERPL-MCNC: 108 MG/DL
HCT VFR BLD AUTO: 33.8 % (ref 34–48)
HGB BLD-MCNC: 11.4 G/DL (ref 11.5–15.5)
MCH RBC QN AUTO: 34.4 PG (ref 26–35)
MCHC RBC AUTO-ENTMCNC: 33.7 G/DL (ref 32–34.5)
MCV RBC AUTO: 102.1 FL (ref 80–99.9)
PLATELET # BLD AUTO: 309 K/UL (ref 130–450)
PMV BLD AUTO: 9.3 FL (ref 7–12)
RBC # BLD AUTO: 3.31 M/UL (ref 3.5–5.5)
WBC OTHER # BLD: 13.3 K/UL (ref 4.5–11.5)

## 2024-08-07 PROCEDURE — 85027 COMPLETE CBC AUTOMATED: CPT

## 2024-08-07 PROCEDURE — 82947 ASSAY GLUCOSE BLOOD QUANT: CPT

## 2024-08-07 PROCEDURE — 86850 RBC ANTIBODY SCREEN: CPT

## 2024-08-07 PROCEDURE — 96372 THER/PROPH/DIAG INJ SC/IM: CPT

## 2024-08-07 PROCEDURE — 86901 BLOOD TYPING SEROLOGIC RH(D): CPT

## 2024-08-07 PROCEDURE — 86900 BLOOD TYPING SEROLOGIC ABO: CPT

## 2024-08-07 PROCEDURE — 36415 COLL VENOUS BLD VENIPUNCTURE: CPT

## 2024-08-07 PROCEDURE — 6360000002 HC RX W HCPCS: Performed by: OBSTETRICS & GYNECOLOGY

## 2024-08-07 RX ADMIN — HUMAN RHO(D) IMMUNE GLOBULIN 300 MCG: 1500 SOLUTION INTRAMUSCULAR; INTRAVENOUS at 15:37

## 2024-08-08 LAB
ABO + RH BLD: NORMAL
BLOOD GROUP ANTIBODIES SERPL: NEGATIVE
COMPONENT: NORMAL
COMPONENT: NORMAL
HISTORY CHECK: NORMAL
Lab: 1
STATUS OF UNITS: NORMAL
TRANSFUSION STATUS: NORMAL
UNIT DIVISION: 0
UNIT NUMBER: NORMAL

## 2024-10-16 ENCOUNTER — HOSPITAL ENCOUNTER (OUTPATIENT)
Age: 24
Discharge: HOME | DRG: 560 | End: 2024-10-16
Attending: OBSTETRICS & GYNECOLOGY | Admitting: OBSTETRICS & GYNECOLOGY
Payer: COMMERCIAL

## 2024-10-16 VITALS
DIASTOLIC BLOOD PRESSURE: 71 MMHG | HEART RATE: 63 BPM | TEMPERATURE: 98.3 F | RESPIRATION RATE: 16 BRPM | SYSTOLIC BLOOD PRESSURE: 111 MMHG

## 2024-10-16 PROBLEM — Z3A.37 37 WEEKS GESTATION OF PREGNANCY: Status: ACTIVE | Noted: 2024-10-16

## 2024-10-16 LAB
ABO + RH BLD: NORMAL
ALBUMIN SERPL-MCNC: 4.1 G/DL (ref 3.5–5.2)
ALP SERPL-CCNC: 150 U/L (ref 35–104)
ALT SERPL-CCNC: 11 U/L (ref 0–32)
ANION GAP SERPL CALCULATED.3IONS-SCNC: 13 MMOL/L (ref 7–16)
ARM BAND NUMBER: NORMAL
AST SERPL-CCNC: 19 U/L (ref 0–31)
BASOPHILS # BLD: 0.04 K/UL (ref 0–0.2)
BASOPHILS NFR BLD: 0 % (ref 0–2)
BILIRUB SERPL-MCNC: 0.4 MG/DL (ref 0–1.2)
BILIRUB UR QL STRIP: NEGATIVE
BLOOD BANK SAMPLE EXPIRATION: NORMAL
BLOOD GROUP ANTIBODIES SERPL: NEGATIVE
BUN SERPL-MCNC: 6 MG/DL (ref 6–20)
CALCIUM SERPL-MCNC: 9.3 MG/DL (ref 8.6–10.2)
CHLORIDE SERPL-SCNC: 102 MMOL/L (ref 98–107)
CLARITY UR: CLEAR
CO2 SERPL-SCNC: 21 MMOL/L (ref 22–29)
COLOR UR: YELLOW
CREAT SERPL-MCNC: 0.7 MG/DL (ref 0.5–1)
EOSINOPHIL # BLD: 0.06 K/UL (ref 0.05–0.5)
EOSINOPHILS RELATIVE PERCENT: 0 % (ref 0–6)
ERYTHROCYTE [DISTWIDTH] IN BLOOD BY AUTOMATED COUNT: 13.5 % (ref 11.5–15)
GFR, ESTIMATED: >90 ML/MIN/1.73M2
GLUCOSE SERPL-MCNC: 66 MG/DL (ref 74–99)
GLUCOSE UR STRIP-MCNC: NEGATIVE MG/DL
HCT VFR BLD AUTO: 35 % (ref 34–48)
HGB BLD-MCNC: 12.1 G/DL (ref 11.5–15.5)
HGB UR QL STRIP.AUTO: NEGATIVE
IMM GRANULOCYTES # BLD AUTO: 0.08 K/UL (ref 0–0.58)
IMM GRANULOCYTES NFR BLD: 1 % (ref 0–5)
KETONES UR STRIP-MCNC: NEGATIVE MG/DL
LEUKOCYTE ESTERASE UR QL STRIP: NEGATIVE
LYMPHOCYTES NFR BLD: 2.5 K/UL (ref 1.5–4)
LYMPHOCYTES RELATIVE PERCENT: 18 % (ref 20–42)
MCH RBC QN AUTO: 33.7 PG (ref 26–35)
MCHC RBC AUTO-ENTMCNC: 34.6 G/DL (ref 32–34.5)
MCV RBC AUTO: 97.5 FL (ref 80–99.9)
MONOCYTES NFR BLD: 0.99 K/UL (ref 0.1–0.95)
MONOCYTES NFR BLD: 7 % (ref 2–12)
NEUTROPHILS NFR BLD: 73 % (ref 43–80)
NEUTS SEG NFR BLD: 9.95 K/UL (ref 1.8–7.3)
NITRITE UR QL STRIP: NEGATIVE
PH UR STRIP: 7 [PH] (ref 5–9)
PLATELET # BLD AUTO: 386 K/UL (ref 130–450)
PMV BLD AUTO: 9.3 FL (ref 7–12)
POTASSIUM SERPL-SCNC: 4.2 MMOL/L (ref 3.5–5)
PROT SERPL-MCNC: 7.9 G/DL (ref 6.4–8.3)
PROT UR STRIP-MCNC: NEGATIVE MG/DL
RBC # BLD AUTO: 3.59 M/UL (ref 3.5–5.5)
RBC #/AREA URNS HPF: NORMAL /HPF
SODIUM SERPL-SCNC: 136 MMOL/L (ref 132–146)
SP GR UR STRIP: 1.01 (ref 1–1.03)
UROBILINOGEN UR STRIP-ACNC: 0.2 EU/DL (ref 0–1)
WBC #/AREA URNS HPF: NORMAL /HPF
WBC OTHER # BLD: 13.6 K/UL (ref 4.5–11.5)

## 2024-10-16 PROCEDURE — 96360 HYDRATION IV INFUSION INIT: CPT

## 2024-10-16 PROCEDURE — 85025 COMPLETE CBC W/AUTO DIFF WBC: CPT

## 2024-10-16 PROCEDURE — 86850 RBC ANTIBODY SCREEN: CPT

## 2024-10-16 PROCEDURE — 2580000003 HC RX 258: Performed by: OBSTETRICS & GYNECOLOGY

## 2024-10-16 PROCEDURE — 96361 HYDRATE IV INFUSION ADD-ON: CPT

## 2024-10-16 PROCEDURE — 86592 SYPHILIS TEST NON-TREP QUAL: CPT

## 2024-10-16 PROCEDURE — 86901 BLOOD TYPING SEROLOGIC RH(D): CPT

## 2024-10-16 PROCEDURE — 86900 BLOOD TYPING SEROLOGIC ABO: CPT

## 2024-10-16 PROCEDURE — 81001 URINALYSIS AUTO W/SCOPE: CPT

## 2024-10-16 PROCEDURE — 80053 COMPREHEN METABOLIC PANEL: CPT

## 2024-10-16 PROCEDURE — 99212 OFFICE O/P EST SF 10 MIN: CPT

## 2024-10-16 RX ORDER — SODIUM CHLORIDE 9 MG/ML
INJECTION, SOLUTION INTRAVENOUS CONTINUOUS
Status: DISCONTINUED | OUTPATIENT
Start: 2024-10-16 | End: 2024-10-16

## 2024-10-16 RX ORDER — SODIUM CHLORIDE, SODIUM LACTATE, POTASSIUM CHLORIDE, CALCIUM CHLORIDE 600; 310; 30; 20 MG/100ML; MG/100ML; MG/100ML; MG/100ML
INJECTION, SOLUTION INTRAVENOUS CONTINUOUS
Status: DISCONTINUED | OUTPATIENT
Start: 2024-10-16 | End: 2024-10-16 | Stop reason: HOSPADM

## 2024-10-16 RX ADMIN — SODIUM CHLORIDE, POTASSIUM CHLORIDE, SODIUM LACTATE AND CALCIUM CHLORIDE: 600; 310; 30; 20 INJECTION, SOLUTION INTRAVENOUS at 12:38

## 2024-10-16 NOTE — PROGRESS NOTES
Discharge instructions given. All questions answered at this time. Verbalized understanding. Teach back medications was successfully completed.

## 2024-10-16 NOTE — PROGRESS NOTES
DEONNA Coffman m called notified of no change to vaginal exam. Pt has period of contractions every couple minutes then spacing out. Pt reports feeling the same as when she came in. Continue to monitor recheck cervix in 2 hours

## 2024-10-16 NOTE — PROGRESS NOTES
SVE unchanged. Notified AMI Coffman CNM. May discharge to home if patient agreeable. Reviewed discharge plan with patient, she will stay a little longer and decide.

## 2024-10-16 NOTE — PROGRESS NOTES
Pt states contractions are less frequent but she feels like they feel stronger. Vaginal exam again at 4pm, pt notified

## 2024-10-16 NOTE — DISCHARGE INSTRUCTIONS
Home Undelivered Discharge Instructions    After Discharge Orders:    keep future appointments.    Call physician or midwife's office with any questions.              Diet:  normal diet as tolerated    Rest: normal activity as tolerated    Other instructions: Do kick counts once a day on your baby. Choose the time of day your baby is most active. Get in a comfortable lying or sitting position and time how long it takes to feel 10 kicks, twists, turns, swishes, or rolls. Call your physician or midwife if there have not been 10 kicks in 2 hours    Call physician or midwife, return to Labor and Delivery, call 911, or go to the nearest Emergency Room if: increased leakage or fluid, contractions more than  8 per  1 hour, decreased fetal movement, persistent low back pain or cramping, bleeding from vaginal area, difficulty urinating, pain with urination, difficulty breathing, new calf pain, persistent headache, or vision change

## 2024-10-16 NOTE — PROGRESS NOTES
DEONNA Coffman at bedside performed vaginal exam 1/thick/ orders received for lab work ad iv fluids.

## 2024-10-16 NOTE — PROGRESS NOTES
ambulatory to unit with complaint of contractions that have gotten stronger today. Maternal perception of fetal movement present, efm applied. Pt denies leaking of fluid or vaginal bleeding

## 2024-10-17 LAB — RPR SER QL: NONREACTIVE

## 2024-10-18 ENCOUNTER — HOSPITAL ENCOUNTER (INPATIENT)
Age: 24
LOS: 1 days | Discharge: HOME | DRG: 560 | End: 2024-10-19
Attending: OBSTETRICS & GYNECOLOGY | Admitting: OBSTETRICS & GYNECOLOGY
Payer: COMMERCIAL

## 2024-10-18 ENCOUNTER — ANESTHESIA (OUTPATIENT)
Dept: LABOR AND DELIVERY | Age: 24
DRG: 560 | End: 2024-10-18
Payer: COMMERCIAL

## 2024-10-18 ENCOUNTER — ANESTHESIA EVENT (OUTPATIENT)
Dept: LABOR AND DELIVERY | Age: 24
DRG: 560 | End: 2024-10-18
Payer: COMMERCIAL

## 2024-10-18 ENCOUNTER — APPOINTMENT (OUTPATIENT)
Dept: LABOR AND DELIVERY | Age: 24
DRG: 560 | End: 2024-10-18
Payer: COMMERCIAL

## 2024-10-18 PROBLEM — Z3A.38 38 WEEKS GESTATION OF PREGNANCY: Status: ACTIVE | Noted: 2024-10-18

## 2024-10-18 LAB
ABO + RH BLD: NORMAL
ALBUMIN SERPL-MCNC: 4.1 G/DL (ref 3.5–5.2)
ALP SERPL-CCNC: 161 U/L (ref 35–104)
ALT SERPL-CCNC: 8 U/L (ref 0–32)
AMPHET UR QL SCN: NEGATIVE
ANION GAP SERPL CALCULATED.3IONS-SCNC: 12 MMOL/L (ref 7–16)
ARM BAND NUMBER: NORMAL
AST SERPL-CCNC: 16 U/L (ref 0–31)
BARBITURATES UR QL SCN: NEGATIVE
BENZODIAZ UR QL: NEGATIVE
BILIRUB SERPL-MCNC: 0.6 MG/DL (ref 0–1.2)
BLOOD BANK SAMPLE EXPIRATION: NORMAL
BLOOD GROUP ANTIBODIES SERPL: NEGATIVE
BUN SERPL-MCNC: 7 MG/DL (ref 6–20)
BUPRENORPHINE UR QL: NEGATIVE
CALCIUM SERPL-MCNC: 9.2 MG/DL (ref 8.6–10.2)
CANNABINOIDS UR QL SCN: POSITIVE
CHLORIDE SERPL-SCNC: 101 MMOL/L (ref 98–107)
CO2 SERPL-SCNC: 22 MMOL/L (ref 22–29)
COCAINE UR QL SCN: NEGATIVE
CREAT SERPL-MCNC: 0.8 MG/DL (ref 0.5–1)
ERYTHROCYTE [DISTWIDTH] IN BLOOD BY AUTOMATED COUNT: 13.5 % (ref 11.5–15)
FENTANYL UR QL: NEGATIVE
GFR, ESTIMATED: >90 ML/MIN/1.73M2
GLUCOSE SERPL-MCNC: 89 MG/DL (ref 74–99)
HCT VFR BLD AUTO: 33.7 % (ref 34–48)
HGB BLD-MCNC: 11.5 G/DL (ref 11.5–15.5)
MCH RBC QN AUTO: 33 PG (ref 26–35)
MCHC RBC AUTO-ENTMCNC: 34.1 G/DL (ref 32–34.5)
MCV RBC AUTO: 96.8 FL (ref 80–99.9)
METHADONE UR QL: NEGATIVE
OPIATES UR QL SCN: NEGATIVE
OXYCODONE UR QL SCN: NEGATIVE
PCP UR QL SCN: NEGATIVE
PLATELET # BLD AUTO: 375 K/UL (ref 130–450)
PMV BLD AUTO: 9.6 FL (ref 7–12)
POTASSIUM SERPL-SCNC: 3.4 MMOL/L (ref 3.5–5)
PROT SERPL-MCNC: 8.1 G/DL (ref 6.4–8.3)
RBC # BLD AUTO: 3.48 M/UL (ref 3.5–5.5)
SODIUM SERPL-SCNC: 135 MMOL/L (ref 132–146)
TEST INFORMATION: ABNORMAL
WBC OTHER # BLD: 12 K/UL (ref 4.5–11.5)

## 2024-10-18 PROCEDURE — 86901 BLOOD TYPING SEROLOGIC RH(D): CPT

## 2024-10-18 PROCEDURE — 80053 COMPREHEN METABOLIC PANEL: CPT

## 2024-10-18 PROCEDURE — 6360000002 HC RX W HCPCS: Performed by: OBSTETRICS & GYNECOLOGY

## 2024-10-18 PROCEDURE — 85027 COMPLETE CBC AUTOMATED: CPT

## 2024-10-18 PROCEDURE — 3700000025 EPIDURAL BLOCK: Performed by: ANESTHESIOLOGY

## 2024-10-18 PROCEDURE — 80307 DRUG TEST PRSMV CHEM ANLYZR: CPT

## 2024-10-18 PROCEDURE — 1220000001 HC SEMI PRIVATE L&D R&B

## 2024-10-18 PROCEDURE — 86900 BLOOD TYPING SEROLOGIC ABO: CPT

## 2024-10-18 PROCEDURE — 2500000003 HC RX 250 WO HCPCS: Performed by: ANESTHESIOLOGY

## 2024-10-18 PROCEDURE — 86592 SYPHILIS TEST NON-TREP QUAL: CPT

## 2024-10-18 PROCEDURE — 10907ZC DRAINAGE OF AMNIOTIC FLUID, THERAPEUTIC FROM PRODUCTS OF CONCEPTION, VIA NATURAL OR ARTIFICIAL OPENING: ICD-10-PCS | Performed by: ANESTHESIOLOGY

## 2024-10-18 PROCEDURE — G0480 DRUG TEST DEF 1-7 CLASSES: HCPCS

## 2024-10-18 PROCEDURE — 7200000001 HC VAGINAL DELIVERY

## 2024-10-18 PROCEDURE — 2580000003 HC RX 258: Performed by: OBSTETRICS & GYNECOLOGY

## 2024-10-18 PROCEDURE — 6370000000 HC RX 637 (ALT 250 FOR IP): Performed by: OBSTETRICS & GYNECOLOGY

## 2024-10-18 PROCEDURE — 86850 RBC ANTIBODY SCREEN: CPT

## 2024-10-18 PROCEDURE — 6370000000 HC RX 637 (ALT 250 FOR IP): Performed by: ADVANCED PRACTICE MIDWIFE

## 2024-10-18 PROCEDURE — 3E033VJ INTRODUCTION OF OTHER HORMONE INTO PERIPHERAL VEIN, PERCUTANEOUS APPROACH: ICD-10-PCS | Performed by: ANESTHESIOLOGY

## 2024-10-18 RX ORDER — ONDANSETRON 4 MG/1
4 TABLET, ORALLY DISINTEGRATING ORAL EVERY 6 HOURS PRN
Status: DISCONTINUED | OUTPATIENT
Start: 2024-10-18 | End: 2024-10-19 | Stop reason: HOSPADM

## 2024-10-18 RX ORDER — SODIUM CHLORIDE, SODIUM LACTATE, POTASSIUM CHLORIDE, CALCIUM CHLORIDE 600; 310; 30; 20 MG/100ML; MG/100ML; MG/100ML; MG/100ML
INJECTION, SOLUTION INTRAVENOUS CONTINUOUS
Status: DISCONTINUED | OUTPATIENT
Start: 2024-10-18 | End: 2024-10-19 | Stop reason: HOSPADM

## 2024-10-18 RX ORDER — SODIUM CHLORIDE 9 MG/ML
25 INJECTION, SOLUTION INTRAVENOUS PRN
Status: DISCONTINUED | OUTPATIENT
Start: 2024-10-18 | End: 2024-10-19 | Stop reason: HOSPADM

## 2024-10-18 RX ORDER — CARBOPROST TROMETHAMINE 250 UG/ML
250 INJECTION, SOLUTION INTRAMUSCULAR PRN
Status: DISCONTINUED | OUTPATIENT
Start: 2024-10-18 | End: 2024-10-19 | Stop reason: HOSPADM

## 2024-10-18 RX ORDER — SODIUM CHLORIDE, SODIUM LACTATE, POTASSIUM CHLORIDE, AND CALCIUM CHLORIDE .6; .31; .03; .02 G/100ML; G/100ML; G/100ML; G/100ML
500 INJECTION, SOLUTION INTRAVENOUS PRN
Status: DISCONTINUED | OUTPATIENT
Start: 2024-10-18 | End: 2024-10-19 | Stop reason: HOSPADM

## 2024-10-18 RX ORDER — DOCUSATE SODIUM 100 MG/1
100 CAPSULE, LIQUID FILLED ORAL 2 TIMES DAILY
Status: DISCONTINUED | OUTPATIENT
Start: 2024-10-18 | End: 2024-10-19 | Stop reason: HOSPADM

## 2024-10-18 RX ORDER — ACETAMINOPHEN 500 MG
1000 TABLET ORAL EVERY 8 HOURS SCHEDULED
Status: DISCONTINUED | OUTPATIENT
Start: 2024-10-18 | End: 2024-10-19 | Stop reason: HOSPADM

## 2024-10-18 RX ORDER — MODIFIED LANOLIN
OINTMENT (GRAM) TOPICAL PRN
Status: DISCONTINUED | OUTPATIENT
Start: 2024-10-18 | End: 2024-10-19 | Stop reason: HOSPADM

## 2024-10-18 RX ORDER — ONDANSETRON 2 MG/ML
4 INJECTION INTRAMUSCULAR; INTRAVENOUS EVERY 6 HOURS PRN
Status: DISCONTINUED | OUTPATIENT
Start: 2024-10-18 | End: 2024-10-19 | Stop reason: HOSPADM

## 2024-10-18 RX ORDER — MISOPROSTOL 200 UG/1
800 TABLET ORAL PRN
Status: DISCONTINUED | OUTPATIENT
Start: 2024-10-18 | End: 2024-10-19 | Stop reason: HOSPADM

## 2024-10-18 RX ORDER — IBUPROFEN 800 MG/1
800 TABLET, FILM COATED ORAL EVERY 8 HOURS SCHEDULED
Status: DISCONTINUED | OUTPATIENT
Start: 2024-10-18 | End: 2024-10-18

## 2024-10-18 RX ORDER — IBUPROFEN 800 MG/1
800 TABLET, FILM COATED ORAL EVERY 8 HOURS SCHEDULED
Status: DISCONTINUED | OUTPATIENT
Start: 2024-10-18 | End: 2024-10-19 | Stop reason: HOSPADM

## 2024-10-18 RX ORDER — ACETAMINOPHEN 500 MG
1000 TABLET ORAL EVERY 8 HOURS SCHEDULED
Status: DISCONTINUED | OUTPATIENT
Start: 2024-10-18 | End: 2024-10-18

## 2024-10-18 RX ORDER — SODIUM CHLORIDE 0.9 % (FLUSH) 0.9 %
5-40 SYRINGE (ML) INJECTION EVERY 12 HOURS SCHEDULED
Status: DISCONTINUED | OUTPATIENT
Start: 2024-10-18 | End: 2024-10-19 | Stop reason: HOSPADM

## 2024-10-18 RX ORDER — SODIUM CHLORIDE 0.9 % (FLUSH) 0.9 %
5-40 SYRINGE (ML) INJECTION PRN
Status: DISCONTINUED | OUTPATIENT
Start: 2024-10-18 | End: 2024-10-19 | Stop reason: HOSPADM

## 2024-10-18 RX ORDER — METHYLERGONOVINE MALEATE 0.2 MG/ML
200 INJECTION INTRAVENOUS PRN
Status: DISCONTINUED | OUTPATIENT
Start: 2024-10-18 | End: 2024-10-19 | Stop reason: HOSPADM

## 2024-10-18 RX ORDER — SODIUM CHLORIDE, SODIUM LACTATE, POTASSIUM CHLORIDE, AND CALCIUM CHLORIDE .6; .31; .03; .02 G/100ML; G/100ML; G/100ML; G/100ML
1000 INJECTION, SOLUTION INTRAVENOUS ONCE
Status: DISCONTINUED | OUTPATIENT
Start: 2024-10-18 | End: 2024-10-19 | Stop reason: HOSPADM

## 2024-10-18 RX ORDER — DOCUSATE SODIUM 100 MG/1
100 CAPSULE, LIQUID FILLED ORAL 2 TIMES DAILY
Status: DISCONTINUED | OUTPATIENT
Start: 2024-10-18 | End: 2024-10-18 | Stop reason: SDUPTHER

## 2024-10-18 RX ORDER — SODIUM CHLORIDE 9 MG/ML
INJECTION, SOLUTION INTRAVENOUS PRN
Status: DISCONTINUED | OUTPATIENT
Start: 2024-10-18 | End: 2024-10-19 | Stop reason: HOSPADM

## 2024-10-18 RX ORDER — TERBUTALINE SULFATE 1 MG/ML
0.25 INJECTION, SOLUTION SUBCUTANEOUS
Status: ACTIVE | OUTPATIENT
Start: 2024-10-18 | End: 2024-10-19

## 2024-10-18 RX ORDER — MISOPROSTOL 200 UG/1
400 TABLET ORAL PRN
Status: DISCONTINUED | OUTPATIENT
Start: 2024-10-18 | End: 2024-10-19 | Stop reason: HOSPADM

## 2024-10-18 RX ADMIN — DOCUSATE SODIUM 100 MG: 100 CAPSULE, LIQUID FILLED ORAL at 23:26

## 2024-10-18 RX ADMIN — Medication 8 ML/HR: at 13:28

## 2024-10-18 RX ADMIN — Medication 2 MILLI-UNITS/MIN: at 11:59

## 2024-10-18 RX ADMIN — SODIUM CHLORIDE, POTASSIUM CHLORIDE, SODIUM LACTATE AND CALCIUM CHLORIDE: 600; 310; 30; 20 INJECTION, SOLUTION INTRAVENOUS at 13:00

## 2024-10-18 RX ADMIN — ACETAMINOPHEN 1000 MG: 500 TABLET ORAL at 23:26

## 2024-10-18 RX ADMIN — Medication 15 ML: at 13:30

## 2024-10-18 RX ADMIN — SODIUM CHLORIDE, POTASSIUM CHLORIDE, SODIUM LACTATE AND CALCIUM CHLORIDE: 600; 310; 30; 20 INJECTION, SOLUTION INTRAVENOUS at 10:11

## 2024-10-18 RX ADMIN — IBUPROFEN 800 MG: 800 TABLET, FILM COATED ORAL at 19:42

## 2024-10-18 ASSESSMENT — PAIN DESCRIPTION - DESCRIPTORS
DESCRIPTORS: SORE
DESCRIPTORS: CRAMPING;SORE

## 2024-10-18 ASSESSMENT — PAIN DESCRIPTION - LOCATION
LOCATION: ABDOMEN
LOCATION: ABDOMEN

## 2024-10-18 ASSESSMENT — LIFESTYLE VARIABLES: SMOKING_STATUS: 1

## 2024-10-18 ASSESSMENT — PAIN SCALES - GENERAL
PAINLEVEL_OUTOF10: 1
PAINLEVEL_OUTOF10: 4

## 2024-10-18 NOTE — PROGRESS NOTES
ambulated self to floor for a schedule induction. Reports positive fetal movement and states has had some bloody show this am. Escorted to room admission process completed. Call bell within reach.

## 2024-10-18 NOTE — PLAN OF CARE
Problem: Vaginal Birth or  Section  Goal: Fetal and maternal status remain reassuring during the birth process  Description:  Birth OB-Pregnancy care plan goal which identifies if the fetal and maternal status remain reassuring during the birth process  10/18/2024 1703 by Alethea Palacio, RN  Outcome: Completed  10/18/2024 1032 by Alethea Palacio, RN  Outcome: Progressing

## 2024-10-18 NOTE — ANESTHESIA PRE PROCEDURE
Department of Anesthesiology  Preprocedure Note       Name:  Apryl Persaud   Age:  23 y.o.  :  2000                                          MRN:  [id number]         Date:  10/18/2024      Surgeon: * No surgeons listed *    Procedure: * No procedures listed *    Medications prior to admission:   Prior to Admission medications    Medication Sig Start Date End Date Taking? Authorizing Provider   meloxicam (MOBIC) 15 MG tablet Take 1 tablet by mouth daily  Patient not taking: Reported on 12/15/2023 3/6/23   Geneva Cherry MD   escitalopram (LEXAPRO) 10 MG tablet Take 1 tablet by mouth daily  Patient not taking: Reported on 12/15/2023 3/6/23   Geneva Cherry MD       Current medications:    Current Facility-Administered Medications   Medication Dose Route Frequency Provider Last Rate Last Admin    lactated ringers IV soln infusion SOLN   IntraVENous Continuous Abel Wyatt MD   Stopped at 10/18/24 1200    lactated ringers bolus 500 mL  500 mL IntraVENous PRN Abel Wyatt MD        Or    lactated ringers bolus 500 mL  500 mL IntraVENous PRN Abel Wyatt .7 mL/hr at 10/18/24 1200 Restarted at 10/18/24 1200    sodium chloride flush 0.9 % injection 5-40 mL  5-40 mL IntraVENous 2 times per day Abel Wyatt MD        sodium chloride flush 0.9 % injection 5-40 mL  5-40 mL IntraVENous PRN Abel Wyatt MD        0.9 % sodium chloride infusion  25 mL IntraVENous PRN Abel Wyatt MD        methylergonovine (METHERGINE) injection 200 mcg  200 mcg IntraMUSCular PRN Abel Wyatt MD        carboprost (HEMABATE) injection 250 mcg  250 mcg IntraMUSCular PRN Abel Wyatt MD        miSOPROStol (CYTOTEC) tablet 400 mcg  400 mcg Buccal PRN Abel Wyatt MD        tranexamic acid (CYKLOKAPRON) 1,000 mg in sodium chloride 0.9 % 100 mL IVPB  1,000 mg IntraVENous Once PRN Abel Wyatt MD        oxytocin (PITOCIN) 30 units in 500 mL infusion  87.3 
  Pulse: 82 89   Resp: 18 18   Temp: 36.8 °C (98.2 °F) 36.7 °C (98.1 °F)   TempSrc: Oral Oral                                              BP Readings from Last 3 Encounters:   10/18/24 110/80   10/16/24 111/71   12/15/23 102/60       NPO Status:                                                                                 BMI:   Wt Readings from Last 3 Encounters:   12/15/23 50.2 kg (110 lb 11.2 oz)   09/29/23 52.5 kg (115 lb 12.8 oz)   07/15/23 56.7 kg (125 lb)     There is no height or weight on file to calculate BMI.    CBC:   Lab Results   Component Value Date/Time    WBC 12.0 10/18/2024 10:00 AM    RBC 3.48 10/18/2024 10:00 AM    HGB 11.5 10/18/2024 10:00 AM    HCT 33.7 10/18/2024 10:00 AM    MCV 96.8 10/18/2024 10:00 AM    RDW 13.5 10/18/2024 10:00 AM     10/18/2024 10:00 AM       CMP:   Lab Results   Component Value Date/Time     10/18/2024 10:00 AM    K 3.4 10/18/2024 10:00 AM     10/18/2024 10:00 AM    CO2 22 10/18/2024 10:00 AM    BUN 7 10/18/2024 10:00 AM    CREATININE 0.8 10/18/2024 10:00 AM    GFRAA >60 12/17/2017 09:15 PM    LABGLOM >90 10/18/2024 10:00 AM    LABGLOM >60 07/15/2023 01:30 PM    GLUCOSE 89 10/18/2024 10:00 AM    CALCIUM 9.2 10/18/2024 10:00 AM    BILITOT 0.6 10/18/2024 10:00 AM    ALKPHOS 161 10/18/2024 10:00 AM    AST 16 10/18/2024 10:00 AM    ALT 8 10/18/2024 10:00 AM       POC Tests: No results for input(s): \"POCGLU\", \"POCNA\", \"POCK\", \"POCCL\", \"POCBUN\", \"POCHEMO\", \"POCHCT\" in the last 72 hours.    Coags: No results found for: \"PROTIME\", \"INR\", \"APTT\"    HCG (If Applicable):   Lab Results   Component Value Date    PREGTESTUR negative 09/29/2023    HCGQUANT 6,786.0 (H) 07/15/2023        ABGs: No results found for: \"PHART\", \"PO2ART\", \"ELM6WKI\", \"FIH6DTQ\", \"BEART\", \"V8DHIHGK\"     Type & Screen (If Applicable):  Lab Results   Component Value Date    ABORH A NEGATIVE 10/18/2024    LABANTI NEGATIVE 10/18/2024       Drug/Infectious Status (If Applicable):  No results

## 2024-10-18 NOTE — H&P
Department of Obstetrics and Gynecology   [employer] History and Physical        CHIEF COMPLAINT:  IOL      HISTORY OF PRESENT ILLNESS:    The patient is a 23 y.o. female , Patient's last menstrual period was 12/10/2023.,  at 38w0d.  OB History          2    Para   1    Term   1            AB        Living   1         SAB        IAB        Ectopic        Molar        Multiple   0    Live Births   1            Patient presents for IOL r/t IUGR.    Estimated Due Date: Estimated Date of Delivery: 24    PRENATAL CARE:    Complicated by:   Patient Active Problem List   Diagnosis Code    Normal vaginal delivery of first pregnancy O80    37 weeks gestation of pregnancy Z3A.37    38 weeks gestation of pregnancy Z3A.38       PAST OB HISTORY  OB History          2    Para   1    Term   1            AB        Living   1         SAB        IAB        Ectopic        Molar        Multiple   0    Live Births   1                Past Medical History:        Diagnosis Date    Asthma     Heart problem     does not know what    Mood disorder (HCC)     Scoliosis      Past Surgical History:        Procedure Laterality Date    DENTAL SURGERY      DENTAL SURGERY       Social History:    TOBACCO:   reports that she has never smoked. She has never used smokeless tobacco.  ETOH:   reports no history of alcohol use.  DRUGS:   reports no history of drug use.  Family History:   No family history on file.  Medications Prior to Admission:  Medications Prior to Admission: [DISCONTINUED] ibuprofen (IBU) 600 MG tablet, Take 1 tablet by mouth every 8 hours as needed for Pain  meloxicam (MOBIC) 15 MG tablet, Take 1 tablet by mouth daily (Patient not taking: Reported on 12/15/2023)  escitalopram (LEXAPRO) 10 MG tablet, Take 1 tablet by mouth daily (Patient not taking: Reported on 12/15/2023)  Allergies:  Patient has no known allergies.    Review of Systems:   Ears, nose, mouth, throat, and face: negative  Respiratory:

## 2024-10-18 NOTE — PROGRESS NOTES
1320 Epidural cath placed.  1321 test dose administered without any adverse reactions.  1328 Epidural bolus given patient tolerated well.  1350 Patient comfortable post epidural.

## 2024-10-18 NOTE — H&P
Department of Obstetrics and Gynecology  CNM Spontaneous Vaginal Birth Note      Pre-operative Diagnosis:  Term pregnancy and Induced labor    Post-operative Diagnosis:  Living  infant(s) and Female    Information for the patient's :  Hung, Baby Pending Shailyn [[id number]]                  Infant Wt:   Information for the patient's :  Hung, Baby Pending Shailyn [[id number]]           APGARS:     Information for the patient's :  Hung, Baby Pending Shailyn [[id number]]           Anesthesia:  epidural anesthesia      Delivery Summary: spontaneous vaginal birth of a viable female infant. The head and shoulders were delivered over an intact perineum in the BARB position. Body was easily delivered and the infant was placed to the maternal abdomen where the cord was clamped x2 and cut with maternal permission.The placenta delivered spontaneously  3-vessel cord present .   Perineum:intact  Mother and Infant are stable.    Labor & Delivery Summary  Dilation Complete Date: 10/18/24  Dilation Complete Time: 1550    Specimen:  placenta     Nuchal Cord: NA     Estimated blood loss: 200 cc            Condition:  infant stable to general nursery    Blood Type and Rh: A NEGATIVE        LETHA Amaya - GUILHERME MONAE CNM 10/18/24

## 2024-10-18 NOTE — ANESTHESIA PROCEDURE NOTES
Epidural Block    Patient location during procedure: OB  Start time: 10/18/2024 1:06 PM  End time: 10/18/2024 1:31 PM  Reason for block: labor epidural  Staffing  Performed: resident/CRNA   Anesthesiologist: Jony Weber MD  Resident/CRNA: Thea Turcios APRN - CRNA  Performed by: Thea Turcios APRN - CRNA  Authorized by: Jony Weber MD    Epidural  Patient position: sitting  Prep: Betadine and site prepped and draped  Patient monitoring: cardiac monitor, continuous pulse ox and frequent blood pressure checks  Approach: midline  Location: L3-4  Injection technique: PENNY air  Guidance: paresthesia technique  Provider prep: mask and sterile gloves  Needle  Needle type: Tuohy   Needle gauge: 18 G  Needle length: 3.5 in  Needle insertion depth: 7 cm  Catheter type: side hole  Catheter size: 18 G  Catheter at skin depth: 12 cm  Test dose: negativeCatheter Secured: tegaderm and tape  Assessment  Sensory level: T4  Events: blood aspirated  Hemodynamics: stable  Attempts: 2  Outcomes: uncomplicated and patient tolerated procedure well  Preanesthetic Checklist  Completed: patient identified, IV checked, site marked, risks and benefits discussed, surgical/procedural consents, equipment checked, pre-op evaluation, timeout performed, anesthesia consent given, oxygen available, monitors applied/VS acknowledged, fire risk safety assessment completed and verbalized and blood product R/B/A discussed and consented

## 2024-10-19 VITALS
OXYGEN SATURATION: 99 % | DIASTOLIC BLOOD PRESSURE: 74 MMHG | HEART RATE: 63 BPM | SYSTOLIC BLOOD PRESSURE: 100 MMHG | TEMPERATURE: 97.7 F | RESPIRATION RATE: 16 BRPM

## 2024-10-19 PROBLEM — Z3A.37 37 WEEKS GESTATION OF PREGNANCY: Status: RESOLVED | Noted: 2024-10-16 | Resolved: 2024-10-19

## 2024-10-19 PROBLEM — Z3A.38 38 WEEKS GESTATION OF PREGNANCY: Status: RESOLVED | Noted: 2024-10-18 | Resolved: 2024-10-19

## 2024-10-19 LAB
ERYTHROCYTE [DISTWIDTH] IN BLOOD BY AUTOMATED COUNT: 13.3 % (ref 11.5–15)
HCT VFR BLD AUTO: 33.9 % (ref 34–48)
HGB BLD-MCNC: 11.5 G/DL (ref 11.5–15.5)
MCH RBC QN AUTO: 33.1 PG (ref 26–35)
MCHC RBC AUTO-ENTMCNC: 33.9 G/DL (ref 32–34.5)
MCV RBC AUTO: 97.7 FL (ref 80–99.9)
PLATELET # BLD AUTO: 332 K/UL (ref 130–450)
PMV BLD AUTO: 9.7 FL (ref 7–12)
RBC # BLD AUTO: 3.47 M/UL (ref 3.5–5.5)
WBC OTHER # BLD: 12.6 K/UL (ref 4.5–11.5)

## 2024-10-19 PROCEDURE — 85027 COMPLETE CBC AUTOMATED: CPT

## 2024-10-19 PROCEDURE — 6370000000 HC RX 637 (ALT 250 FOR IP): Performed by: OBSTETRICS & GYNECOLOGY

## 2024-10-19 PROCEDURE — 6370000000 HC RX 637 (ALT 250 FOR IP): Performed by: ADVANCED PRACTICE MIDWIFE

## 2024-10-19 RX ORDER — IBUPROFEN 800 MG/1
800 TABLET, FILM COATED ORAL EVERY 8 HOURS SCHEDULED
Qty: 50 TABLET | Refills: 1 | Status: SHIPPED | OUTPATIENT
Start: 2024-10-19

## 2024-10-19 RX ADMIN — ACETAMINOPHEN 1000 MG: 500 TABLET ORAL at 10:48

## 2024-10-19 RX ADMIN — IBUPROFEN 800 MG: 800 TABLET, FILM COATED ORAL at 04:33

## 2024-10-19 RX ADMIN — IBUPROFEN 800 MG: 800 TABLET, FILM COATED ORAL at 14:11

## 2024-10-19 RX ADMIN — DOCUSATE SODIUM 100 MG: 100 CAPSULE, LIQUID FILLED ORAL at 08:58

## 2024-10-19 RX ADMIN — Medication: at 10:52

## 2024-10-19 ASSESSMENT — PAIN DESCRIPTION - LOCATION
LOCATION: ABDOMEN

## 2024-10-19 ASSESSMENT — PAIN DESCRIPTION - DESCRIPTORS
DESCRIPTORS: ACHING;DISCOMFORT;CRAMPING
DESCRIPTORS: CRAMPING
DESCRIPTORS: ACHING;DISCOMFORT;CRAMPING

## 2024-10-19 ASSESSMENT — PAIN SCALES - GENERAL
PAINLEVEL_OUTOF10: 5
PAINLEVEL_OUTOF10: 8
PAINLEVEL_OUTOF10: 8

## 2024-10-19 ASSESSMENT — PAIN - FUNCTIONAL ASSESSMENT
PAIN_FUNCTIONAL_ASSESSMENT: ACTIVITIES ARE NOT PREVENTED

## 2024-10-19 NOTE — PROGRESS NOTES
Hearing screening results were discussed with parent. Questions answered. Brochure given to parent. Advised to monitor developmental milestones and contact physician for any concerns.   Electronically signed by Freddy Ferrera on 10/19/2024 at 10:43 AM

## 2024-10-19 NOTE — PLAN OF CARE
Problem: Postpartum  Goal: Experiences normal postpartum course  Description:  Postpartum OB-Pregnancy care plan goal which identifies if the mother is experiencing a normal postpartum course  Outcome: Adequate for Discharge  Goal: Appropriate maternal -  bonding  Description:  Postpartum OB-Pregnancy care plan goal which identifies if the mother and  are bonding appropriately  Outcome: Adequate for Discharge  Goal: Establishment of infant feeding pattern  Description:  Postpartum OB-Pregnancy care plan goal which identifies if the mother is establishing a feeding pattern with their   Outcome: Adequate for Discharge  Goal: Incisions, wounds, or drain sites healing without S/S of infection  Outcome: Adequate for Discharge     Problem: Pain  Goal: Verbalizes/displays adequate comfort level or baseline comfort level  Outcome: Adequate for Discharge     Problem: Infection - Adult  Goal: Absence of infection at discharge  Outcome: Adequate for Discharge  Goal: Absence of infection during hospitalization  Outcome: Adequate for Discharge  Goal: Absence of fever/infection during anticipated neutropenic period  Outcome: Adequate for Discharge     Problem: Safety - Adult  Goal: Free from fall injury  Outcome: Adequate for Discharge     Problem: Discharge Planning  Goal: Discharge to home or other facility with appropriate resources  Outcome: Adequate for Discharge

## 2024-10-19 NOTE — CARE COORDINATION
10/19/2024: SS NOTE:  SS Consult noted regarding \"mom + mj\", per chart review, pt's UDS + for Cannabinoid on admisson, 's UDS results still pending, met with pt, Apryl and father of baby, Hattie Conway IV who was at her bedside and remained present for consult with her permission.  Both parents were very pleasant and open to speaking with sw, FOB was holding their  daughter, Dennise Conway and they were attentive and caring with , no other parenting concerns reported or noted.  Apryl reports having a medical marijuana card for history of Scoliosis and Depression but did not have her card with her, she also reports past issues of Post Partum Depression but currently denies any PPD symptoms, she did accept PPD counseling resources if needed in the future.  She reports living with FOB and her 6 year old daughter, Daniella Persaud, she reports having all the necessary support and provisions to safely take her baby home (car seat, basinet, crib, clothing, diapers) and is planning to breast feed but will call for a WIC appointment if needed.  ALISIA-  mandated  guide for plan of safe care assessment completed and report called to Ryan ,[facility], will need to review information with agency supervisor and UDS results on  for a determination if case will be assigned but there is NO AGENCY HOLD on  and NO SAFETY PLAN NEEDED prior to baby's discharge home, Nursery notified.Electronically signed by BELKIS Anne on 10/19/2024 at 3:52 PM

## 2024-10-19 NOTE — PROGRESS NOTES
Assumed care of patient at this time. Report received from previous RN at bedside.  Plan of care discussed, White board updated, call light within reach, no additional concerns at this time.

## 2024-10-19 NOTE — DISCHARGE SUMMARY
Obstetric Discharge Summary    Admitting Diagnosis  IUP 38 weeks  OB History          2    Para   2    Term   2            AB        Living   2         SAB        IAB        Ectopic        Molar        Multiple   0    Live Births   2                Reasons for Admission on 10/18/2024  9:29 AM  38 weeks gestation of pregnancy [Z3A.38]  No comment available  Onset of Labor    Prenatal Procedures  Ultrasound # 3    Intrapartum Procedures        Multiple birth?: No        Spontaneous Vaginal Delivery: See Labor and Delivery Summary       Postpartum Procedures  None    Postpartum/Operative Complications        Data  Information for the patient's :  Marcy Persaud [[id number]]   female   Birth Weight: 2.24 kg (4 lb 15 oz)  Discharge With Mother  Complications: No    Discharge Diagnosis       Discharge Information  Current Discharge Medication List        CONTINUE these medications which have NOT CHANGED    Details   meloxicam (MOBIC) 15 MG tablet Take 1 tablet by mouth daily  Qty: 90 tablet, Refills: 1    Associated Diagnoses: Polyarthralgia      escitalopram (LEXAPRO) 10 MG tablet Take 1 tablet by mouth daily  Qty: 90 tablet, Refills: 1    Associated Diagnoses: Anxiety           STOP taking these medications       ibuprofen (IBU) 600 MG tablet Comments:   Reason for Stopping:               No discharge procedures on file.    Discharged Condition: good    Discharge to: Home  Follow up in 6 weeks at [facility].  Accompanied by self.    Discharge Date: 10/19/2024       Abel Shaw MD  10/19/2024

## 2024-10-19 NOTE — PROGRESS NOTES
Discharge instructions reviewed with patient and significant other. All questions answered. Patient verbalized understanding. Will call out when ready to leave unit.

## 2024-10-21 LAB
ABNORMAL SPECIMEN VALIDITY TEST: ABNORMAL
COMPLIANCE DRUG ANALYSIS, URINE: NORMAL
INTEGRITY CHECK, CREATININE, URINE: 304.4 MG/DL (ref 22–250)
INTEGRITY CHECK, OXIDANT, URINE: <40 MG/L
INTEGRITY CHECK, PH, URINE: 6.1 (ref 4.5–9)
INTEGRITY CHECK, SPECIFIC GRAVITY, URINE: 1.02 (ref 1–1.03)
RPR SER QL: NONREACTIVE
THC NORMALIZED, QUANTITIATIVE, URINE: ABNORMAL NG/ML
THC-COOH, QUANTITATIVE, URINE: >1000 NG/ML

## 2024-10-23 NOTE — ANESTHESIA POSTPROCEDURE EVALUATION
Department of Anesthesiology  Postprocedure Note    Patient: Apryl Persaud  MRN: [id number]  YOB: 2000  Date of evaluation: 10/23/2024    Procedure Summary       Date: 10/18/24 Room / Location:     Anesthesia Start: 1306 Anesthesia Stop: 1619    Procedure: Labor Analgesia Diagnosis:     Scheduled Providers:  Responsible Provider: Jony Weber MD    Anesthesia Type: general, spinal, epidural ASA Status: 2            Anesthesia Type: No value filed.    Damian Phase I:      Damian Phase II:      Anesthesia Post Evaluation    Patient location during evaluation: bedside  Patient participation: complete - patient participated  Level of consciousness: awake  Pain score: 0  Airway patency: patent  Nausea & Vomiting: no vomiting and no nausea  Cardiovascular status: hemodynamically stable  Respiratory status: acceptable  Hydration status: stable  Pain management: adequate        No notable events documented.